# Patient Record
Sex: MALE | Race: WHITE | Employment: OTHER | URBAN - METROPOLITAN AREA
[De-identification: names, ages, dates, MRNs, and addresses within clinical notes are randomized per-mention and may not be internally consistent; named-entity substitution may affect disease eponyms.]

---

## 2017-01-24 ENCOUNTER — HOSPITAL ENCOUNTER (OUTPATIENT)
Age: 82
Setting detail: OBSERVATION
Discharge: HOME | End: 2017-01-26
Attending: STUDENT IN AN ORGANIZED HEALTH CARE EDUCATION/TRAINING PROGRAM | Admitting: INTERNAL MEDICINE
Payer: MEDICARE

## 2017-01-24 ENCOUNTER — APPOINTMENT (OUTPATIENT)
Dept: MRI IMAGING | Age: 82
End: 2017-01-24
Attending: STUDENT IN AN ORGANIZED HEALTH CARE EDUCATION/TRAINING PROGRAM
Payer: MEDICARE

## 2017-01-24 ENCOUNTER — APPOINTMENT (OUTPATIENT)
Dept: GENERAL RADIOLOGY | Age: 82
End: 2017-01-24
Attending: STUDENT IN AN ORGANIZED HEALTH CARE EDUCATION/TRAINING PROGRAM
Payer: MEDICARE

## 2017-01-24 ENCOUNTER — APPOINTMENT (OUTPATIENT)
Dept: CT IMAGING | Age: 82
End: 2017-01-24
Attending: STUDENT IN AN ORGANIZED HEALTH CARE EDUCATION/TRAINING PROGRAM
Payer: MEDICARE

## 2017-01-24 DIAGNOSIS — H53.8 BLURRED VISION, LEFT EYE: Primary | ICD-10-CM

## 2017-01-24 PROBLEM — Z98.49 HISTORY OF CATARACT EXTRACTION: Status: ACTIVE | Noted: 2017-01-24

## 2017-01-24 LAB
ALBUMIN SERPL BCP-MCNC: 3.4 G/DL (ref 3.2–4.6)
ALBUMIN/GLOB SERPL: 1 {RATIO} (ref 1.2–3.5)
ALP SERPL-CCNC: 98 U/L (ref 50–136)
ALT SERPL-CCNC: 15 U/L (ref 12–65)
ANION GAP BLD CALC-SCNC: 8 MMOL/L (ref 7–16)
AST SERPL W P-5'-P-CCNC: 22 U/L (ref 15–37)
ATRIAL RATE: 50 BPM
BACTERIA SPEC CULT: NORMAL
BASOPHILS # BLD AUTO: 0 K/UL (ref 0–0.2)
BASOPHILS # BLD: 1 % (ref 0–2)
BILIRUB SERPL-MCNC: 0.4 MG/DL (ref 0.2–1.1)
BUN SERPL-MCNC: 38 MG/DL (ref 8–23)
CALCIUM SERPL-MCNC: 8.4 MG/DL (ref 8.3–10.4)
CALCULATED P AXIS, ECG09: 116 DEGREES
CALCULATED R AXIS, ECG10: -79 DEGREES
CALCULATED T AXIS, ECG11: 165 DEGREES
CHLORIDE SERPL-SCNC: 109 MMOL/L (ref 98–107)
CO2 SERPL-SCNC: 28 MMOL/L (ref 21–32)
CREAT SERPL-MCNC: 2.09 MG/DL (ref 0.8–1.5)
CRP SERPL-MCNC: <0.2 MG/DL (ref 0–0.9)
DIAGNOSIS, 93000: NORMAL
DIASTOLIC BP, ECG02: NORMAL MMHG
DIFFERENTIAL METHOD BLD: ABNORMAL
EOSINOPHIL # BLD: 0.3 K/UL (ref 0–0.8)
EOSINOPHIL NFR BLD: 5 % (ref 0.5–7.8)
ERYTHROCYTE [DISTWIDTH] IN BLOOD BY AUTOMATED COUNT: 14 % (ref 11.9–14.6)
ERYTHROCYTE [SEDIMENTATION RATE] IN BLOOD: 25 MM/HR (ref 0–30)
GLOBULIN SER CALC-MCNC: 3.5 G/DL (ref 2.3–3.5)
GLUCOSE SERPL-MCNC: 122 MG/DL (ref 65–100)
HCT VFR BLD AUTO: 38.4 % (ref 41.1–50.3)
HGB BLD-MCNC: 12 G/DL (ref 13.6–17.2)
IMM GRANULOCYTES # BLD: 0 K/UL (ref 0–0.5)
IMM GRANULOCYTES NFR BLD AUTO: 0.3 % (ref 0–5)
LYMPHOCYTES # BLD AUTO: 25 % (ref 13–44)
LYMPHOCYTES # BLD: 1.6 K/UL (ref 0.5–4.6)
MCH RBC QN AUTO: 31.7 PG (ref 26.1–32.9)
MCHC RBC AUTO-ENTMCNC: 31.3 G/DL (ref 31.4–35)
MCV RBC AUTO: 101.6 FL (ref 79.6–97.8)
MONOCYTES # BLD: 0.5 K/UL (ref 0.1–1.3)
MONOCYTES NFR BLD AUTO: 8 % (ref 4–12)
NEUTS SEG # BLD: 3.8 K/UL (ref 1.7–8.2)
NEUTS SEG NFR BLD AUTO: 61 % (ref 43–78)
P-R INTERVAL, ECG05: NORMAL MS
PLATELET # BLD AUTO: 162 K/UL (ref 150–450)
PMV BLD AUTO: 11.1 FL (ref 10.8–14.1)
POTASSIUM SERPL-SCNC: 4.3 MMOL/L (ref 3.5–5.1)
PROT SERPL-MCNC: 6.9 G/DL (ref 6.3–8.2)
Q-T INTERVAL, ECG07: 484 MS
QRS DURATION, ECG06: 144 MS
QTC CALCULATION (BEZET), ECG08: 441 MS
RBC # BLD AUTO: 3.78 M/UL (ref 4.23–5.67)
SERVICE CMNT-IMP: NORMAL
SODIUM SERPL-SCNC: 145 MMOL/L (ref 136–145)
SYSTOLIC BP, ECG01: NORMAL MMHG
VENTRICULAR RATE, ECG03: 50 BPM
WBC # BLD AUTO: 6.3 K/UL (ref 4.3–11.1)

## 2017-01-24 PROCEDURE — 70450 CT HEAD/BRAIN W/O DYE: CPT

## 2017-01-24 PROCEDURE — 85025 COMPLETE CBC W/AUTO DIFF WBC: CPT | Performed by: STUDENT IN AN ORGANIZED HEALTH CARE EDUCATION/TRAINING PROGRAM

## 2017-01-24 PROCEDURE — 85652 RBC SED RATE AUTOMATED: CPT | Performed by: STUDENT IN AN ORGANIZED HEALTH CARE EDUCATION/TRAINING PROGRAM

## 2017-01-24 PROCEDURE — 87641 MR-STAPH DNA AMP PROBE: CPT | Performed by: INTERNAL MEDICINE

## 2017-01-24 PROCEDURE — 74011250636 HC RX REV CODE- 250/636: Performed by: INTERNAL MEDICINE

## 2017-01-24 PROCEDURE — 99285 EMERGENCY DEPT VISIT HI MDM: CPT | Performed by: STUDENT IN AN ORGANIZED HEALTH CARE EDUCATION/TRAINING PROGRAM

## 2017-01-24 PROCEDURE — 93005 ELECTROCARDIOGRAM TRACING: CPT | Performed by: STUDENT IN AN ORGANIZED HEALTH CARE EDUCATION/TRAINING PROGRAM

## 2017-01-24 PROCEDURE — 74011250637 HC RX REV CODE- 250/637: Performed by: INTERNAL MEDICINE

## 2017-01-24 PROCEDURE — 99218 HC RM OBSERVATION: CPT

## 2017-01-24 PROCEDURE — 80053 COMPREHEN METABOLIC PANEL: CPT | Performed by: STUDENT IN AN ORGANIZED HEALTH CARE EDUCATION/TRAINING PROGRAM

## 2017-01-24 PROCEDURE — 74011000302 HC RX REV CODE- 302: Performed by: INTERNAL MEDICINE

## 2017-01-24 PROCEDURE — 71020 XR CHEST PA LAT: CPT

## 2017-01-24 PROCEDURE — 86140 C-REACTIVE PROTEIN: CPT | Performed by: STUDENT IN AN ORGANIZED HEALTH CARE EDUCATION/TRAINING PROGRAM

## 2017-01-24 PROCEDURE — 86580 TB INTRADERMAL TEST: CPT | Performed by: INTERNAL MEDICINE

## 2017-01-24 PROCEDURE — 96374 THER/PROPH/DIAG INJ IV PUSH: CPT | Performed by: STUDENT IN AN ORGANIZED HEALTH CARE EDUCATION/TRAINING PROGRAM

## 2017-01-24 RX ORDER — HYDROCODONE BITARTRATE AND ACETAMINOPHEN 5; 325 MG/1; MG/1
1 TABLET ORAL
Status: DISCONTINUED | OUTPATIENT
Start: 2017-01-24 | End: 2017-01-26 | Stop reason: HOSPADM

## 2017-01-24 RX ORDER — NALOXONE HYDROCHLORIDE 0.4 MG/ML
0.4 INJECTION, SOLUTION INTRAMUSCULAR; INTRAVENOUS; SUBCUTANEOUS AS NEEDED
Status: DISCONTINUED | OUTPATIENT
Start: 2017-01-24 | End: 2017-01-26 | Stop reason: HOSPADM

## 2017-01-24 RX ORDER — ZOLPIDEM TARTRATE 5 MG/1
5 TABLET ORAL
Status: DISCONTINUED | OUTPATIENT
Start: 2017-01-24 | End: 2017-01-26 | Stop reason: HOSPADM

## 2017-01-24 RX ORDER — HEPARIN SODIUM 5000 [USP'U]/ML
5000 INJECTION, SOLUTION INTRAVENOUS; SUBCUTANEOUS EVERY 8 HOURS
Status: DISCONTINUED | OUTPATIENT
Start: 2017-01-24 | End: 2017-01-26 | Stop reason: HOSPADM

## 2017-01-24 RX ORDER — LORAZEPAM 2 MG/ML
1 INJECTION INTRAMUSCULAR
Status: DISCONTINUED | OUTPATIENT
Start: 2017-01-24 | End: 2017-01-26 | Stop reason: HOSPADM

## 2017-01-24 RX ORDER — FUROSEMIDE 20 MG/1
20 TABLET ORAL EVERY OTHER DAY
Status: DISCONTINUED | OUTPATIENT
Start: 2017-01-25 | End: 2017-01-26 | Stop reason: HOSPADM

## 2017-01-24 RX ORDER — SODIUM CHLORIDE 9 MG/ML
100 INJECTION, SOLUTION INTRAVENOUS CONTINUOUS
Status: DISCONTINUED | OUTPATIENT
Start: 2017-01-24 | End: 2017-01-24

## 2017-01-24 RX ORDER — LEVOTHYROXINE SODIUM 75 UG/1
75 TABLET ORAL
Status: DISCONTINUED | OUTPATIENT
Start: 2017-01-25 | End: 2017-01-26 | Stop reason: HOSPADM

## 2017-01-24 RX ORDER — SODIUM BICARBONATE 650 MG/1
650 TABLET ORAL 3 TIMES DAILY
Status: DISCONTINUED | OUTPATIENT
Start: 2017-01-24 | End: 2017-01-26 | Stop reason: HOSPADM

## 2017-01-24 RX ORDER — CLOPIDOGREL BISULFATE 75 MG/1
75 TABLET ORAL DAILY
Status: DISCONTINUED | OUTPATIENT
Start: 2017-01-25 | End: 2017-01-26 | Stop reason: HOSPADM

## 2017-01-24 RX ORDER — SODIUM CHLORIDE 0.9 % (FLUSH) 0.9 %
5-10 SYRINGE (ML) INJECTION EVERY 8 HOURS
Status: DISCONTINUED | OUTPATIENT
Start: 2017-01-24 | End: 2017-01-26 | Stop reason: HOSPADM

## 2017-01-24 RX ORDER — ACETAMINOPHEN 325 MG/1
650 TABLET ORAL
Status: DISCONTINUED | OUTPATIENT
Start: 2017-01-24 | End: 2017-01-26 | Stop reason: HOSPADM

## 2017-01-24 RX ORDER — ONDANSETRON 2 MG/ML
4 INJECTION INTRAMUSCULAR; INTRAVENOUS
Status: DISCONTINUED | OUTPATIENT
Start: 2017-01-24 | End: 2017-01-26 | Stop reason: HOSPADM

## 2017-01-24 RX ORDER — CARVEDILOL 6.25 MG/1
6.25 TABLET ORAL 2 TIMES DAILY WITH MEALS
Status: DISCONTINUED | OUTPATIENT
Start: 2017-01-24 | End: 2017-01-26 | Stop reason: HOSPADM

## 2017-01-24 RX ORDER — SIMVASTATIN 10 MG/1
20 TABLET, FILM COATED ORAL
Status: DISCONTINUED | OUTPATIENT
Start: 2017-01-24 | End: 2017-01-26 | Stop reason: HOSPADM

## 2017-01-24 RX ORDER — SODIUM CHLORIDE 0.9 % (FLUSH) 0.9 %
5-10 SYRINGE (ML) INJECTION AS NEEDED
Status: DISCONTINUED | OUTPATIENT
Start: 2017-01-24 | End: 2017-01-26 | Stop reason: HOSPADM

## 2017-01-24 RX ORDER — HYDRALAZINE HYDROCHLORIDE 20 MG/ML
20 INJECTION INTRAMUSCULAR; INTRAVENOUS
Status: DISCONTINUED | OUTPATIENT
Start: 2017-01-24 | End: 2017-01-26 | Stop reason: HOSPADM

## 2017-01-24 RX ORDER — DIPHENHYDRAMINE HYDROCHLORIDE 50 MG/ML
12.5 INJECTION, SOLUTION INTRAMUSCULAR; INTRAVENOUS
Status: DISCONTINUED | OUTPATIENT
Start: 2017-01-24 | End: 2017-01-26 | Stop reason: HOSPADM

## 2017-01-24 RX ORDER — FACIAL-BODY WIPES
10 EACH TOPICAL DAILY PRN
Status: DISCONTINUED | OUTPATIENT
Start: 2017-01-24 | End: 2017-01-26 | Stop reason: HOSPADM

## 2017-01-24 RX ADMIN — LORAZEPAM 1 MG: 2 INJECTION INTRAMUSCULAR; INTRAVENOUS at 16:44

## 2017-01-24 RX ADMIN — TUBERCULIN PURIFIED PROTEIN DERIVATIVE 5 UNITS: 5 INJECTION, SOLUTION INTRADERMAL at 19:43

## 2017-01-24 RX ADMIN — Medication 10 ML: at 21:22

## 2017-01-24 RX ADMIN — SIMVASTATIN 20 MG: 10 TABLET, FILM COATED ORAL at 21:22

## 2017-01-24 RX ADMIN — SODIUM BICARBONATE TAB 650 MG 650 MG: 650 TAB at 21:21

## 2017-01-24 NOTE — PROGRESS NOTES
Attempted MRI - patient disoriented and moving despite straps, sand bags and many reminders. Patient came up off the table and it was unsafe to continue. Staff response called for assistance. Notified ER who said the patient was now an ICU patient. Called ICU and they said the patient was not there patient yet. Sent for transport - close guard assist at all times.

## 2017-01-24 NOTE — ED PROVIDER NOTES
HPI Comments: 26-year-old male patient presents to the emergency department with reports of left-sided visual blurriness. Patient states he woke with the symptoms this morning. He denies any pain to the eye, redness or drainage. He denies any dizzy or lightheaded feeling, facial droop or trouble with his speech. He denies any focal weakness numbness or tingling elsewhere in his body. Patient denies previously similar symptoms in the past.  Of note he has undergone cataract removal in both his left and right eye. He reports constant symptoms since waking this morning. He states his vision was normal prior to going to bed last night. He reports a history of coronary artery disease, open heart surgery with bypass and stent placement. Patient is currently taking Plavix secondary to stents. He denies any other symptoms at this time. Patient is a 80 y.o. male presenting with blurred vision. The history is provided by the patient and a relative. No  was used. Blurred Vision    This is a new problem. The current episode started 3 to 5 hours ago. The problem occurs constantly. The problem has not changed since onset. The left eye is affected. The injury mechanism was none. The pain is at a severity of 0/10. The patient is experiencing no pain. There is no history of trauma to the eye. There is no known exposure to pink eye. He does not wear contacts. Associated symptoms include blurred vision and decreased vision. Pertinent negatives include no numbness, no discharge, no double vision, no foreign body sensation, no photophobia, no eye redness, no nausea, no vomiting, no tingling, no weakness, no itching, no fever, no pain, no blindness, no head injury and no dizziness. He has tried nothing for the symptoms. The treatment provided no relief.         Past Medical History:   Diagnosis Date    Angina  6/10/2016    Arthritis     CAD (coronary artery disease)      CABG    Cancer ([facility] [address]      skin cancer removed 5 years ago    Chronic kidney disease     GERD (gastroesophageal reflux disease)     Heart failure (HCC)     Hypertension     Orthopnea     Other ill-defined conditions(799.89)      HYPERLIPIDEMIA/ GOUT/ HYPOTHYROID    Syncope, near     Thyroid disease        Past Surgical History:   Procedure Laterality Date    Hx gi      Hx appendectomy      Hx other surgical       Left inguinal hernia surgery    Pr cardiac surg procedure unlist       cabg 10 years ago    Hx heent       cataract         History reviewed. No pertinent family history. Social History     Social History    Marital status:      Spouse name: N/A    Number of children: N/A    Years of education: N/A     Occupational History    Not on file. Social History Main Topics    Smoking status: Former Smoker    Smokeless tobacco: Not on file    Alcohol use No    Drug use: No    Sexual activity: Not on file     Other Topics Concern    Not on file     Social History Narrative         ALLERGIES: Review of patient's allergies indicates no known allergies. Review of Systems   Constitutional: Negative for chills, diaphoresis and fever. HENT: Negative for congestion, sneezing and sore throat. Eyes: Positive for blurred vision and visual disturbance. Negative for blindness, double vision, photophobia, pain, discharge and redness. Respiratory: Negative for cough, chest tightness, shortness of breath and wheezing. Cardiovascular: Negative for chest pain and leg swelling. Gastrointestinal: Negative for abdominal pain, blood in stool, diarrhea, nausea and vomiting. Endocrine: Negative for polyuria. Genitourinary: Negative for difficulty urinating, dysuria, flank pain, hematuria and urgency. Musculoskeletal: Negative for back pain, myalgias, neck pain and neck stiffness. Skin: Negative for color change, itching and rash.    Neurological: Negative for dizziness, tingling, syncope, speech difficulty, weakness, light-headedness, numbness and headaches. Psychiatric/Behavioral: Negative for behavioral problems. All other systems reviewed and are negative. Vitals:    01/24/17 1016   BP: 128/84   Pulse: (!) 56   Resp: 18   Temp: 97.5 °F (36.4 °C)   SpO2: 93%   Weight: 59 kg (130 lb)   Height: 5' 10\" (1.778 m)            Physical Exam   Constitutional: He is oriented to person, place, and time. He appears well-developed and well-nourished. No distress. HENT:   Head: Normocephalic and atraumatic. Eyes: Conjunctivae and EOM are normal. Right eye exhibits no chemosis, no discharge, no exudate and no hordeolum. No foreign body present in the right eye. Left eye exhibits no chemosis, no discharge, no exudate and no hordeolum. No foreign body present in the left eye. Right conjunctiva is not injected. Right conjunctiva has no hemorrhage. Left conjunctiva is not injected. Left conjunctiva has no hemorrhage. No scleral icterus. Right eye exhibits normal extraocular motion and no nystagmus. Left eye exhibits normal extraocular motion and no nystagmus. Right pupil is round and reactive. Left pupil is not round and not reactive. Left pupil is oblong in shape secondary to surgical changes from previous cataract surgery. Minimally reactive. Well-appearing right eyes/pupil   Neck: Normal range of motion and full passive range of motion without pain. Neck supple. Normal carotid pulses, no hepatojugular reflux and no JVD present. No spinous process tenderness and no muscular tenderness present. Carotid bruit is not present. No rigidity. No tracheal deviation, no edema, no erythema and normal range of motion present. Cardiovascular: Normal rate, regular rhythm, S1 normal, S2 normal, normal heart sounds and intact distal pulses. Exam reveals no gallop, no distant heart sounds and no friction rub. No murmur heard. Pulmonary/Chest: Effort normal and breath sounds normal. No accessory muscle usage or stridor.  No tachypnea and no bradypnea. No respiratory distress. He has no decreased breath sounds. He has no wheezes. He has no rhonchi. He has no rales. He exhibits no tenderness. Abdominal: Soft. Normal appearance. He exhibits no distension and no mass. There is no hepatosplenomegaly, splenomegaly or hepatomegaly. There is no tenderness. There is no rebound, no guarding and no CVA tenderness. Musculoskeletal: Normal range of motion. He exhibits no edema, tenderness or deformity. Neurological: He is alert and oriented to person, place, and time. He has normal reflexes. He displays no atrophy and no tremor. No cranial nerve deficit. He exhibits normal muscle tone. He displays a negative Romberg sign. He displays no seizure activity. Coordination and gait normal. GCS eye subscore is 4. GCS verbal subscore is 5. GCS motor subscore is 6. No focal nerve deficits. Subjective visual field blurring in the left eye. Skin: Skin is warm and dry. No rash noted. He is not diaphoretic. Psychiatric: He has a normal mood and affect. His behavior is normal.   Nursing note and vitals reviewed. MDM  Number of Diagnoses or Management Options  Blurred vision, left eye: new and requires workup  Diagnosis management comments: CT scan shows no acute abnormality. Chronic small vessel changes noted by radiologist.    Patient evaluated by specialist on call who recommends admission for MRI and carotid ultrasound imaging. Recommends CRP and sedimentation rate evaluation as well.   In addition patient should be referred to ophthalmology for a dilated retinal exam.       Amount and/or Complexity of Data Reviewed  Clinical lab tests: ordered and reviewed  Tests in the radiology section of CPT®: ordered and reviewed  Tests in the medicine section of CPT®: ordered and reviewed  Independent visualization of images, tracings, or specimens: yes    Risk of Complications, Morbidity, and/or Mortality  Presenting problems: moderate  Diagnostic procedures: low  Management options: moderate    Patient Progress  Patient progress: stable    ED Course       Procedures

## 2017-01-24 NOTE — PROGRESS NOTES
TRANSFER - IN REPORT:    Verbal report received from [employer] on Zaheer Monaco  being received from ED for routine progression of care      Report consisted of patients Situation, Background, Assessment and   Recommendations(SBAR). Information from the following report(s) ED Summary was reviewed with the receiving nurse. Opportunity for questions and clarification was provided. Assessment completed upon patients arrival to unit and care assumed.

## 2017-01-24 NOTE — H&P
Hospitalist H&P/Consult Note     Admit Date:  2017 10:25 AM   Name:  Antoinette Villa   Age:  80 y.o.  :  5/15/1922   MRN:  [id number]   PCP:  Hyacinth Andres MD  Treatment Team: Attending Provider: Celena Dao DO; Primary Nurse: Soo Kaufman RN    HPI:   Patient is a 81 y/o M who p/w complaint of severe L eye blurred vision that he noticed when he woke up this morning. Denies any other symptoms. Denies eye pain, eye trauma, HA, n/v, focal weakness/numbness, trouble with speech, confusion. Denies similar previous episodes. Denies trouble walking. Some issues with coordination due to vision. Denies hx of CVA/TIA. In on plavix for stenting in past.  On statin also. ER called teleneuro who felt pt needed MRI and carotid US to eval for possible CVA. 10 systems reviewed and negative except as noted in HPI. Past Medical History   Diagnosis Date    Angina  6/10/2016    Arthritis     CAD (coronary artery disease)      CABG    Cancer (HCC)      skin cancer removed 5 years ago    Chronic kidney disease     GERD (gastroesophageal reflux disease)     Heart failure ([facility] [address]     Hypertension     Orthopnea     Other ill-defined conditions(799.89)      HYPERLIPIDEMIA/ GOUT/ HYPOTHYROID    Syncope, near     Thyroid disease       Past Surgical History   Procedure Laterality Date    Hx gi      Hx appendectomy      Hx other surgical       Left inguinal hernia surgery    Pr cardiac surg procedure unlist       cabg 10 years ago    Hx heent       cataract      Prior to Admission Medications   Prescriptions Last Dose Informant Patient Reported? Taking? CYANOCOBALAMIN/FA/PYRIDOXINE (HOMOCYSTEINE FORMULA PO)   Yes No   Sig: Take 2 Tabs by mouth daily. LEVOCARNITINE (CARNITINE PO)   Yes No   Sig: Take 50 mg by mouth. amLODIPine (NORVASC) 10 mg tablet   Yes No   Sig: Take  by mouth daily.      carvedilol (COREG) 6.25 mg tablet   Yes No   Sig: Take  by mouth two (2) times daily (with meals). cholecalciferol (VITAMIN D3) 400 unit Tab tablet   Yes No   Sig: Take  by mouth daily. clopidogrel (PLAVIX) 75 mg tablet   No No   Sig: Take 1 Tab by mouth daily. coQ10, ubiquinol, 100 mg cap   Yes No   Sig: Take  by mouth.     colchicine 0.6 mg tablet   Yes No   Sig: Take 0.6 mg by mouth daily. furosemide (LASIX) 40 mg tablet   Yes No   Sig: Take 20 mg by mouth every other day. isosorbide mononitrate ER (IMDUR) 60 mg CR tablet   Yes No   Sig: Take  by mouth every morning.     krill oil 500 mg cap   Yes No   Sig: Take 1,000 mg by mouth two (2) times a day. lecithin 1,200 mg cap   Yes No   Sig: Take  by mouth three (3) times daily. levothyroxine (SYNTHROID) 75 mcg tablet   Yes No   Sig: Take  by mouth Daily (before breakfast). magnesium 500 mg Tab   Yes No   Sig: Take  by mouth daily. nitroglycerin (NITROSTAT) 0.4 mg SL tablet   Yes No   Sig: by SubLINGual route every five (5) minutes as needed. nitroglycerin (NITROSTAT) 0.4 mg SL tablet   No No   Si Tab by SubLINGual route every five (5) minutes as needed for Chest Pain.   simvastatin (ZOCOR) 40 mg tablet   Yes No   Sig: Take 20 mg by mouth nightly.     sodium bicarbonate 650 mg tablet   Yes No   Sig: Take  by mouth three (3) times daily. valsartan (DIOVAN) 160 mg tablet   Yes No   Sig: Take 80 mg by mouth two (2) times a day. zinc 50 mg capsule   Yes No   Sig: Take  by mouth daily. Facility-Administered Medications: None     No Known Allergies   Social History   Substance Use Topics    Smoking status: Former Smoker    Smokeless tobacco: Not on file    Alcohol use No      History reviewed. No pertinent family history. There is no immunization history for the selected administration types on file for this patient.     Objective:   Patient Vitals for the past 24 hrs:   Temp Pulse Resp BP SpO2   17 1211 - (!) 53 20 179/81 96 %   17 1016 97.5 °F (36.4 °C) (!) 56 18 128/84 93 %     Oxygen Therapy  O2 Sat (%): 96 % (01/24/17 1211)  O2 Device: Room air (01/24/17 1211)  No intake or output data in the 24 hours ending 01/24/17 1429    Physical Exam:  General:    Well nourished. Alert. Eyes:   Severe blurred vision L eye; can see my shape but cannot see my face or count fingers on my hand with good eye covered. Normal sclera. Extraocular movements intact. PERRL  ENT:  Normocephalic, atraumatic. Moist mucous membranes  CV:   RRR. No murmur, rub, or gallop. Lungs:  CTAB. No wheezing, rhonchi, or rales. Abdomen: Soft, nontender, nondistended. Bowel sounds normal.   Extremities: Warm and dry. No cyanosis or edema. Neurologic: CN II-XII grossly intact. Sensation intact. Skin:     No rashes or jaundice. No wounds. Psych:  Normal mood and affect. I reviewed the labs, imaging, EKGs, telemetry, and other studies done this admission. Data Review:   Recent Results (from the past 24 hour(s))   CBC WITH AUTOMATED DIFF    Collection Time: 01/24/17 11:20 AM   Result Value Ref Range    WBC 6.3 4.3 - 11.1 K/uL    RBC 3.78 (L) 4.23 - 5.67 M/uL    HGB 12.0 (L) 13.6 - 17.2 g/dL    HCT 38.4 (L) 41.1 - 50.3 %    .6 (H) 79.6 - 97.8 FL    MCH 31.7 26.1 - 32.9 PG    MCHC 31.3 (L) 31.4 - 35.0 g/dL    RDW 14.0 11.9 - 14.6 %    PLATELET 724 383 - 867 K/uL    MPV 11.1 10.8 - 14.1 FL    DF AUTOMATED      NEUTROPHILS 61 43 - 78 %    LYMPHOCYTES 25 13 - 44 %    MONOCYTES 8 4.0 - 12.0 %    EOSINOPHILS 5 0.5 - 7.8 %    BASOPHILS 1 0.0 - 2.0 %    IMMATURE GRANULOCYTES 0.3 0.0 - 5.0 %    ABS. NEUTROPHILS 3.8 1.7 - 8.2 K/UL    ABS. LYMPHOCYTES 1.6 0.5 - 4.6 K/UL    ABS. MONOCYTES 0.5 0.1 - 1.3 K/UL    ABS. EOSINOPHILS 0.3 0.0 - 0.8 K/UL    ABS. BASOPHILS 0.0 0.0 - 0.2 K/UL    ABS. IMM.  GRANS. 0.0 0.0 - 0.5 K/UL   METABOLIC PANEL, COMPREHENSIVE    Collection Time: 01/24/17 11:20 AM   Result Value Ref Range    Sodium 145 136 - 145 mmol/L    Potassium 4.3 3.5 - 5.1 mmol/L    Chloride 109 (H) 98 - 107 mmol/L CO2 28 21 - 32 mmol/L    Anion gap 8 7 - 16 mmol/L    Glucose 122 (H) 65 - 100 mg/dL    BUN 38 (H) 8 - 23 MG/DL    Creatinine 2.09 (H) 0.8 - 1.5 MG/DL    GFR est AA 38 (L) >60 ml/min/1.73m2    GFR est non-AA 32 (L) >60 ml/min/1.73m2    Calcium 8.4 8.3 - 10.4 MG/DL    Bilirubin, total 0.4 0.2 - 1.1 MG/DL    ALT 15 12 - 65 U/L    AST 22 15 - 37 U/L    Alk. phosphatase 98 50 - 136 U/L    Protein, total 6.9 6.3 - 8.2 g/dL    Albumin 3.4 3.2 - 4.6 g/dL    Globulin 3.5 2.3 - 3.5 g/dL    A-G Ratio 1.0 (L) 1.2 - 3.5     SED RATE, AUTOMATED    Collection Time: 01/24/17 11:20 AM   Result Value Ref Range    Sed rate, automated 25 0 - 30 mm/hr   C REACTIVE PROTEIN, QT    Collection Time: 01/24/17 11:20 AM   Result Value Ref Range    C-Reactive protein <0.2 0.0 - 0.9 mg/dL       Imaging Studies:  CXR Results  (Last 48 hours)               01/24/17 1042  XR CHEST PA LAT Final result    Impression:  Impression: Stable two-view chest. No acute abnormality. Narrative:  History: chest pain       Two views chest       COMPARISON: 11/12/2012       Findings: Stable postoperative changes noted. The lungs are well expanded and   clear. The cardiac silhouette, and mediastinal contour, and osseous structures   are stable. CT Results  (Last 48 hours)               01/24/17 1049  CT HEAD WO CONT Final result    Impression:  Impression: Chronic appearing white matter change without acute intracranial   abnormality. There is generalized cerebral atrophy as well. Narrative:  History: Vision changes, blurriness in the left eye. Exam: CT head without contrast       Technique: Thin section axial CT images were obtained from the skullbase through   the vertex. Radiation dose reduction techniques were used for this study. Our   CT scanners use one or all of the following: Automated exposure control,   adjustment of the mA and/or kV according to patient size, use of iterative   reconstruction. Findings: The ventricles are normal in size, shape, and position. There is   generalized cerebral atrophy with chronic appearing white matter change in the   corona radiata and centrum semiovale. No extra-axial fluid collection is   present. There is no mass or mass-effect. The basilar cisterns are patent. The   paranasal sinuses and mastoid air cells are clear. Assessment and Plan:     Hospital Problems as of 1/24/2017  Date Reviewed: 1/24/2017          Codes Class Noted - Resolved POA    * (Principal)Blurred vision, left eye ICD-10-CM: H53.8  ICD-9-CM: 368.8  1/24/2017 - Present Yes        History of cataract extraction ICD-10-CM: Z98.49  ICD-9-CM: V45.61  1/24/2017 - Present         Coronary artery disease involving coronary bypass graft of native heart with angina pectoris with documented spasm (HCC) (Chronic) ICD-10-CM: I25.701  ICD-9-CM: 414.05, 413.9  6/15/2016 - Present Yes        Chronic systolic heart failure (HCC) (Chronic) ICD-10-CM: I50.22  ICD-9-CM: 428.22  11/12/2012 - Present Yes        Hypothyroidism (Chronic) ICD-10-CM: E03.9  ICD-9-CM: 244.9  11/12/2012 - Present Yes        Essential hypertension, benign (Chronic) ICD-10-CM: I10  ICD-9-CM: 401.1  11/12/2012 - Present Yes        CKD (chronic kidney disease) stage 3, GFR 30-59 ml/min (Chronic) ICD-10-CM: N18.3  ICD-9-CM: 585.3  11/12/2012 - Present Yes              PLAN:  · Observation on telemetry. Can convert to inpt if CVA seen on MRI  · per teleneuro recs: check MRI and carotid US. Referral to optho if negative for dilated exam; have been told they don't do this as inpatient. ESR CRP neg. · CHF stable, chronic. Cont home meds. · CKD stable, monitor.     FEN:  Cardiac diet  DVT ppx:  heparin  Code status:  Full per pt  Estimated LOS:  24 hours  Anticipated discharge needs:  none  Plan of care discussed with:  pt  Risk:  high    Signed:  Sarahy Canales MD

## 2017-01-24 NOTE — ED NOTES
TRANSFER - OUT REPORT:    Verbal report given to adele kolb on [employer]  being transferred to [phone]) for routine progression of care       Report consisted of patients Situation, Background, Assessment and   Recommendations(SBAR). Information from the following report(s) ED Summary was reviewed with the receiving nurse. Lines:       Opportunity for questions and clarification was provided.       Patient transported with:   Registered Nurse

## 2017-01-24 NOTE — IP AVS SNAPSHOT
[address] [place] [address] 
[phone] Patient: Aden Johnson MRN: [id number] :5/15/1922 You are allergic to the following No active allergies Immunizations Administered for This Admission Name Date  
 TB Skin Test (PPD) Intradermal 2017 Recent Documentation Height Weight BMI Smoking Status 1.778 m 62.8 kg 19.86 kg/m2 Former Smoker Emergency Contacts Name Discharge Info Relation Home Work Mobile Jack Lorenzo  Son [22] [phone] About your hospitalization You were admitted on:  2017 You last received care in the:  [address] You were discharged on:  2017 Unit phone number:  [phone] Why you were hospitalized Your primary diagnosis was:  Blurred Vision, Left Eye Your diagnoses also included:  Ckd (Chronic Kidney Disease) Stage 3, Gfr 30-59 Ml/Min, Chronic Systolic Heart Failure (Hcc), 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 Isaias Bri.  @ 1:45 @ [facility] [place] [address] [place] [address] 
[phone] MD Alfonso Junior Chayo [address] [phone] Current Discharge Medication List  
  
 CONTINUE these medications which have CHANGED Dose & Instructions Dispensing Information Comments Morning Noon Evening Bedtime  
 nitroglycerin 0.4 mg SL tablet Commonly known as:  NITROSTAT What changed:  Another medication with the same name was removed. Continue taking this medication, and follow the directions you see here. Your next dose is: Today, Tomorrow Other:  _________ Dose:  0.4 mg  
1 Tab by SubLINGual route every five (5) minutes as needed for Chest Pain. Quantity:  1 Bottle Refills:  3 CONTINUE these medications which have NOT CHANGED Dose & Instructions Dispensing Information Comments Morning Noon Evening Bedtime  
 amLODIPine 10 mg tablet Commonly known as:  Khadijah Gables Your next dose is: Today, Tomorrow Other:  _________ Take  by mouth daily. Refills:  0 CARNITINE PO Your next dose is: Today, Tomorrow Other:  _________ Dose:  50 mg Take 50 mg by mouth. Refills:  0  
     
   
   
   
  
 carvedilol 6.25 mg tablet Commonly known as:  Gaylin Windber Your next dose is: Today, Tomorrow Other:  _________ Take  by mouth two (2) times daily (with meals). Refills:  0  
     
   
   
   
  
 cholecalciferol 400 unit Tab tablet Commonly known as:  VITAMIN D3 Your next dose is: Today, Tomorrow Other:  _________ Take  by mouth daily. Refills:  0  
     
   
   
   
  
 clopidogrel 75 mg Tab Commonly known as:  PLAVIX Your next dose is: Today, Tomorrow Other:  _________ Dose:  75 mg Take 1 Tab by mouth daily. Quantity:  90 Tab Refills:  3  
     
   
   
   
  
 colchicine 0.6 mg tablet Your next dose is: Today, Tomorrow Other:  _________ Dose:  0.6 mg Take 0.6 mg by mouth daily. Refills:  0  
     
   
   
   
  
 coQ10 (ubiquinol) 100 mg Cap Your next dose is: Today, Tomorrow Other:  _________ Take  by mouth. Refills:  0  
     
   
   
   
  
 HOMOCYSTEINE FORMULA PO Your next dose is: Today, Tomorrow Other:  _________ Dose:  2 Tab Take 2 Tabs by mouth daily. Refills:  0  
     
   
   
   
  
 isosorbide mononitrate ER 60 mg CR tablet Commonly known as:  IMDUR Your next dose is: Today, Tomorrow Other:  _________ Take  by mouth every morning. Refills:  0  
     
   
   
   
  
 krill oil 500 mg Cap Your next dose is: Today, Tomorrow Other:  _________ Dose:  1000 mg Take 1,000 mg by mouth two (2) times a day. Refills:  0  
     
   
   
   
  
 LASIX 40 mg tablet Generic drug:  furosemide Your next dose is: Today, Tomorrow Other:  _________ Dose:  20 mg Take 20 mg by mouth every other day. Refills:  0  
     
   
   
   
  
 lecithin 1,200 mg Cap Your next dose is: Today, Tomorrow Other:  _________ Take  by mouth three (3) times daily. Refills:  0  
     
   
   
   
  
 levothyroxine 75 mcg tablet Commonly known as:  SYNTHROID Your next dose is: Today, Tomorrow Other:  _________ Take  by mouth Daily (before breakfast). Refills:  0  
     
   
   
   
  
 magnesium 500 mg Tab Your next dose is: Today, Tomorrow Other:  _________ Take  by mouth daily. Refills:  0  
     
   
   
   
  
 simvastatin 40 mg tablet Commonly known as:  ZOCOR Your next dose is: Today, Tomorrow Other:  _________ Dose:  20 mg Take 20 mg by mouth nightly. Refills:  0  
     
   
   
   
  
 sodium bicarbonate 650 mg tablet Your next dose is: Today, Tomorrow Other:  _________ Take  by mouth three (3) times daily. Refills:  0  
     
   
   
   
  
 valsartan 160 mg tablet Commonly known as:  DIOVAN  
 Your next dose is: Today, Tomorrow Other:  _________ Dose:  80 mg Take 80 mg by mouth two (2) times a day. Refills:  0  
     
   
   
   
  
 zinc 50 mg capsule Your next dose is: Today, Tomorrow Other:  _________ Take  by mouth daily. Refills:  0 Discharge Instructions DISCHARGE SUMMARY from Nurse The following personal items are in your possession at time of discharge: 
 
Dental Appliances: At bedside Visual Aid: Glasses, With patient Home Medications: None Jewelry: None Clothing: Pants, Shirt, Socks, Undergarments, With patient Other Valuables: None PATIENT INSTRUCTIONS: 
 
After general anesthesia or intravenous sedation, for 24 hours or while taking prescription Narcotics: · Limit your activities · Do not drive and operate hazardous machinery · Do not make important personal or business decisions · Do  not drink alcoholic beverages · If you have not urinated within 8 hours after discharge, please contact your surgeon on call. Report the following to your surgeon: 
· Excessive pain, swelling, redness or odor of or around the surgical area · Temperature over 100.5 · Nausea and vomiting lasting longer than 4 hours or if unable to take medications · Any signs of decreased circulation or nerve impairment to extremity: change in color, persistent  numbness, tingling, coldness or increase pain · Any questions What to do at Home: 
Recommended activity: Activity as tolerated and No driving while on analgesics, If you experience any of the above symptoms, please follow up with MD. 
 
 
*  Please give a list of your current medications to your Primary Care Provider. *  Please update this list whenever your medications are discontinued, doses are 
    changed, or new medications (including over-the-counter products) are added. *  Please carry medication information at all times in case of emergency situations. These are general instructions for a healthy lifestyle: No smoking/ No tobacco products/ Avoid exposure to second hand smoke Surgeon General's Warning:  Quitting smoking now greatly reduces serious risk to your health. Obesity, smoking, and sedentary lifestyle greatly increases your risk for illness A healthy diet, regular physical exercise & weight monitoring are important for maintaining a healthy lifestyle You may be retaining fluid if you have a history of heart failure or if you experience any of the following symptoms:  Weight gain of 3 pounds or more overnight or 5 pounds in a week, increased swelling in our hands or feet or shortness of breath while lying flat in bed. Please call your doctor as soon as you notice any of these symptoms; do not wait until your next office visit. Recognize signs and symptoms of STROKE: 
 
F-face looks uneven A-arms unable to move or move unevenly S-speech slurred or non-existent T-time-call 911 as soon as signs and symptoms begin-DO NOT go Back to bed or wait to see if you get better-TIME IS BRAIN. Warning Signs of HEART ATTACK Call 911 if you have these symptoms: 
? Chest discomfort. Most heart attacks involve discomfort in the center of the chest that lasts more than a few minutes, or that goes away and comes back. It can feel like uncomfortable pressure, squeezing, fullness, or pain. ? Discomfort in other areas of the upper body. Symptoms can include pain or discomfort in one or both arms, the back, neck, jaw, or stomach. ? Shortness of breath with or without chest discomfort. ? Other signs may include breaking out in a cold sweat, nausea, or lightheadedness. Don't wait more than five minutes to call [address]! Fast action can save your life.  Calling 911 is almost always the fastest way to get lifesaving treatment. Emergency Medical Services staff can begin treatment when they arrive  up to an hour sooner than if someone gets to the hospital by car. The discharge information has been reviewed with the patient and caregiver. The patient and caregiver verbalized understanding. Discharge medications reviewed with the patient and caregiver and appropriate educational materials and side effects teaching were provided. Discharge Instructions Attachments/References WEAKNESS: GENERALIZED (ENGLISH) HAND-WASHING (ENGLISH) SECONDHAND SMOKE (ENGLISH) Discharge Orders None [facility] Announcement We are excited to announce that we are making your provider's discharge notes available to you in [employer]. You will see these notes when they are completed and signed by the physician that discharged you from your recent hospital stay. If you have any questions or concerns about any information you see in [employer], please call the Health Information Department where you were seen or reach out to your Primary Care Provider for more information about your plan of care. Introducing [facility]! Neal Dorman introduces [employer] patient portal. Now you can access parts of your medical record, email your doctor's office, and request medication refills online. 1. In your internet browser, go to [email or web address] 2. Click on the First Time User? Click Here link in the Sign In box. You will see the New Member Sign Up page. 3. Enter your [employer] Access Code exactly as it appears below. You will not need to use this code after youve completed the sign-up process. If you do not sign up before the expiration date, you must request a new code. · [employer] Access Code: [email or web address] Expires: 4/24/2017  9:59 AM 
 
4.  Enter the last four digits of your Social Security Number (xxxx) and Date of Birth (mm/dd/yyyy) as indicated and click Submit. You will be taken to the next sign-up page. 5. Create a VoterTide ID. This will be your VoterTide login ID and cannot be changed, so think of one that is secure and easy to remember. 6. Create a VoterTide password. You can change your password at any time. 7. Enter your Password Reset Question and Answer. This can be used at a later time if you forget your password. 8. Enter your e-mail address. You will receive e-mail notification when new information is available in [address] 9. Click Sign Up. You can now view and download portions of your medical record. 10. Click the Download Summary menu link to download a portable copy of your medical information. If you have questions, please visit the Frequently Asked Questions section of the [employer]oter[employer] website. Remember, [employer]oter[employer] is NOT to be used for urgent needs. For medical emergencies, dial 911. Now available from your iPhone and Android! General Information Please provide this summary of care documentation to your next provider. Patient Signature:  ____________________________________________________________ Date:  ____________________________________________________________  
  
Ja Walker Provider Signature:  ____________________________________________________________ Date:  ____________________________________________________________ More Information Weakness: Care Instructions Your Care Instructions Weakness is a lack of physical or muscle strength. You may feel that you need to make extra effort to move your arms, legs, or other muscles. Generalized weakness means that you feel weak in most areas of your body. Another type of weakness may affect just one muscle or group of muscles. You may feel weak and tired after you have done too much activity, such as taking an extra-long hike. This is not a serious problem.  It often goes away on its own. Feeling weak can also be caused by medical conditions like thyroid problems, depression, or a virus. Sometimes the cause can be serious. Your doctor may want to do more tests to try to find the cause of the weakness. The doctor has checked you carefully, but problems can develop later. If you notice any problems or new symptoms, get medical treatment right away. Follow-up care is a key part of your treatment and safety. Be sure to make and go to all appointments, and call your doctor if you are having problems. It's also a good idea to know your test results and keep a list of the medicines you take. How can you care for yourself at home? · Rest when you feel tired. · Be safe with medicines. If your doctor prescribed medicine, take it exactly as prescribed. Call your doctor if you think you are having a problem with your medicine. You will get more details on the specific medicines your doctor prescribes. · Do not skip meals. Eating a balanced diet may increase your energy level. · Get some physical activity every day, but do not get too tired. When should you call for help? Call your doctor now or seek immediate medical care if: 
· You have new or worse weakness. · You are dizzy or lightheaded, or you feel like you may faint. Watch closely for changes in your health, and be sure to contact your doctor if: 
· You do not get better as expected. Where can you learn more? Go to http://scooby-morris.info/. Enter [id number] [id number] 0607 in the search box to learn more about \"Weakness: Care Instructions. \" Current as of: May 27, 2016 Content Version: 11.1 © 8413-2309 [employer]. Care instructions adapted under license by [employer] (which disclaims liability or warranty for this information).  If you have questions about a medical condition or this instruction, always ask your healthcare professional. [employer] disclaims any warranty or liability for your use of this information. Hand-Washing: Care Instructions Your Care Instructions It is important for caregivers to wash their hands properly. This is the single best way to prevent the spread of infections. Hand-washing can help keep you from getting sick. It is easy, doesn't cost much, and it works. Make sure that you and your caregivers follow safe hand-washing routines. Caregivers may include health care workers or family members at home or in a care facility. You can talk to them about this information on hand-washing. Follow-up care is a key part of your treatment and safety. Be sure to make and go to all appointments, and call your doctor if you are having problems. It's also a good idea to know your test results and keep a list of the medicines you take. How can you care for yourself at home? · Caregivers should wash their hands with soap and water: ¨ When their hands are dirty, especially after being exposed to body fluids. This includes blood. ¨ When their hands may have been exposed to germs that could spread infection. ¨ After they touch broken skin, sores, or wound bandages. ¨ After they use the bathroom. · At other times, caregivers can use an alcohol-based gel  or soap and water to clean hands. This should be done: ¨ Before and after any contact with you. ¨ After they take off gloves. ¨ Before they handle a device that touches your body (even if gloves are used). ¨ After they touch any objects near you, such as medical equipment, lights, or doorknobs. ¨ Before they handle medicine or prepare food. Proper hand-washing for caregivers · When using an alcohol-based gel , fill your palm with the gel. Then spread it all over your hands. Rub your hands together until they are dry. · When washing hands with soap and water: ¨ Get your hands wet. Then use enough soap to cover your whole hands. ¨ Rub your hands together hard, in a [place]. Be sure that you cover all surfaces. Rub your wrists, the backs of your hands, between your fingers, and under your fingernails. Wash your hands for at least 30 seconds. ¨ Rinse your hands with water. But don't use hot water. It may irritate your hands. ¨ Use a paper towel to hold the faucet handle when you turn off the water. ¨ Dry your hands well with a paper towel. Don't use towels that have been used by others or used more than once. · If you use bar soap, use small bars. Set the soap on a rack that lets water drain. Where can you learn more? Go to http://scooby-morris.info/. Enter U949 in the search box to learn more about \"Hand-Washing: Care Instructions. \" Current as of: May 24, 2016 Content Version: 11.1 © 0159-5968 [employer]. Care instructions adapted under license by [employer] (which disclaims liability or warranty for this information). If you have questions about a medical condition or this instruction, always ask your healthcare professional. [place] [address] any warranty or liability for your use of this information. Secondhand Smoke: Care Instructions Your Care Instructions Secondhand smoke comes from the burning end of a cigarette, cigar, or pipe and the smoke that a smoker exhales. The smoke contains nicotine and many other harmful chemicals. Breathing secondhand smoke can cause or worsen health problems including cancer, asthma, coronary artery disease, and respiratory infections. It can make your eyes and nose burn and cause a sore throat. Secondhand smoke is especially bad for babies and young children whose lungs are still developing. Babies whose parents smoke are more likely to have ear infections, pneumonia, and bronchitis in the first few years of their lives. Secondhand smoke can make asthma symptoms worse in children. If you are pregnant, it is important that you not smoke and that you avoid secondhand smoke. You are more likely to give birth to a baby who weighs less than expected (low birth weight) if you smoke. And your baby may have a greater risk for sudden infant death syndrome (SIDS). Babies whose mothers are exposed to secondhand smoke during pregnancy have a higher risk for health problems. Follow-up care is a key part of your treatment and safety. Be sure to make and go to all appointments, and call your doctor if you are having problems. It's also a good idea to know your test results and keep a list of the medicines you take. How can you care for yourself at home? · Do not smoke or let anyone else smoke in your home. If people must smoke, ask them to go outside. · If people do smoke in your home, choose a room where you can open a window or use a fan to get the smoke outside. · Do not let anyone smoke in your car. If someone must smoke, pull over in a safe place and let him or her smoke away from the car. · Ask your employer to make sure that you have a smoke-free work area. · Make sure that your children are not exposed to secondhand smoke at day care, school, and after-school programs. · Try to choose nonsmoking bars, restaurants, and other public places when you go out. · Help your family and friends who smoke to quit by encouraging them to try. Tell them about treatment resources. Having support from others often helps. · If you smoke, quit. Quitting is hard, but there are ways to boost your chance of quitting tobacco for good. ¨ Use nicotine gum, patches, or lozenges. Call a quitline. Ask your doctor about stop-smoking programs and medicines. ¨ Keep trying. When should you call for help? Watch closely for changes in your health, and be sure to contact your doctor if you have any problems. Where can you learn more? Go to http://scooby-morris.info/. Enter L004 in the search box to learn more about \"Secondhand Smoke: Care Instructions. \" Current as of: May 26, 2016 Content Version: 11.1 © 2102-5010 [employer]. Care instructions adapted under license by [employer] (which disclaims liability or warranty for this information). If you have questions about a medical condition or this instruction, always ask your healthcare professional. [place] [address] any warranty or liability for your use of this information.

## 2017-01-24 NOTE — IP AVS SNAPSHOT
Current Discharge Medication List  
  
Take these medications at their scheduled times Dose & Instructions Dispensing Information Comments Morning Noon Evening Bedtime  
 amLODIPine 10 mg tablet Commonly known as:  Oren Rosenberg Your next dose is: Today, Tomorrow Other:  ____________ Take  by mouth daily. Refills:  0  
     
   
   
   
  
 carvedilol 6.25 mg tablet Commonly known as:  Kristine Lundberg Your next dose is: Today, Tomorrow Other:  ____________ Take  by mouth two (2) times daily (with meals). Refills:  0  
     
   
   
   
  
 cholecalciferol 400 unit Tab tablet Commonly known as:  VITAMIN D3 Your next dose is: Today, Tomorrow Other:  ____________ Take  by mouth daily. Refills:  0  
     
   
   
   
  
 clopidogrel 75 mg Tab Commonly known as:  PLAVIX Your next dose is: Today, Tomorrow Other:  ____________ Dose:  75 mg Take 1 Tab by mouth daily. Quantity:  90 Tab Refills:  3  
     
   
   
   
  
 colchicine 0.6 mg tablet Your next dose is: Today, Tomorrow Other:  ____________ Dose:  0.6 mg Take 0.6 mg by mouth daily. Refills:  0  
     
   
   
   
  
 HOMOCYSTEINE FORMULA PO Your next dose is: Today, Tomorrow Other:  ____________ Dose:  2 Tab Take 2 Tabs by mouth daily. Refills:  0  
     
   
   
   
  
 isosorbide mononitrate ER 60 mg CR tablet Commonly known as:  IMDUR Your next dose is: Today, Tomorrow Other:  ____________ Take  by mouth every morning. Refills:  0  
     
   
   
   
  
 krill oil 500 mg Cap Your next dose is: Today, Tomorrow Other:  ____________ Dose:  1000 mg Take 1,000 mg by mouth two (2) times a day. Refills:  0  
     
   
   
   
  
 LASIX 40 mg tablet Generic drug:  furosemide Your next dose is: Today, Tomorrow Other:  ____________ Dose:  20 mg Take 20 mg by mouth every other day. Refills:  0  
     
   
   
   
  
 lecithin 1,200 mg Cap Your next dose is: Today, Tomorrow Other:  ____________ Take  by mouth three (3) times daily. Refills:  0  
     
   
   
   
  
 levothyroxine 75 mcg tablet Commonly known as:  SYNTHROID Your next dose is: Today, Tomorrow Other:  ____________ Take  by mouth Daily (before breakfast). Refills:  0  
     
   
   
   
  
 magnesium 500 mg Tab Your next dose is: Today, Tomorrow Other:  ____________ Take  by mouth daily. Refills:  0  
     
   
   
   
  
 simvastatin 40 mg tablet Commonly known as:  ZOCOR Your next dose is: Today, Tomorrow Other:  ____________ Dose:  20 mg Take 20 mg by mouth nightly. Refills:  0  
     
   
   
   
  
 sodium bicarbonate 650 mg tablet Your next dose is: Today, Tomorrow Other:  ____________ Take  by mouth three (3) times daily. Refills:  0  
     
   
   
   
  
 valsartan 160 mg tablet Commonly known as:  DIOVAN Your next dose is: Today, Tomorrow Other:  ____________ Dose:  80 mg Take 80 mg by mouth two (2) times a day. Refills:  0  
     
   
   
   
  
 zinc 50 mg capsule Your next dose is: Today, Tomorrow Other:  ____________ Take  by mouth daily. Refills:  0 Take these medications as needed Dose & Instructions Dispensing Information Comments Morning Noon Evening Bedtime  
 nitroglycerin 0.4 mg SL tablet Commonly known as:  NITROSTAT Your next dose is: Today, Tomorrow Other:  ____________ Dose:  0.4 mg  
1 Tab by SubLINGual route every five (5) minutes as needed for Chest Pain. Quantity:  1 Bottle Refills:  3 Take these medications as directed Dose & Instructions Dispensing Information Comments Morning Noon Evening Bedtime CARNITINE PO Your next dose is: Today, Tomorrow Other:  ____________ Dose:  50 mg Take 50 mg by mouth. Refills:  0  
     
   
   
   
  
 coQ10 (ubiquinol) 100 mg Cap Your next dose is: Today, Tomorrow Other:  ____________ Take  by mouth. Refills:  0

## 2017-01-25 ENCOUNTER — APPOINTMENT (OUTPATIENT)
Dept: MRI IMAGING | Age: 82
End: 2017-01-25
Attending: STUDENT IN AN ORGANIZED HEALTH CARE EDUCATION/TRAINING PROGRAM
Payer: MEDICARE

## 2017-01-25 ENCOUNTER — APPOINTMENT (OUTPATIENT)
Dept: ULTRASOUND IMAGING | Age: 82
End: 2017-01-25
Attending: INTERNAL MEDICINE
Payer: MEDICARE

## 2017-01-25 LAB
ANION GAP BLD CALC-SCNC: 11 MMOL/L (ref 7–16)
BASOPHILS # BLD AUTO: 0 K/UL (ref 0–0.2)
BASOPHILS # BLD: 0 % (ref 0–2)
BUN SERPL-MCNC: 30 MG/DL (ref 8–23)
CALCIUM SERPL-MCNC: 8.9 MG/DL (ref 8.3–10.4)
CHLORIDE SERPL-SCNC: 107 MMOL/L (ref 98–107)
CO2 SERPL-SCNC: 26 MMOL/L (ref 21–32)
CREAT SERPL-MCNC: 1.66 MG/DL (ref 0.8–1.5)
DIFFERENTIAL METHOD BLD: ABNORMAL
EOSINOPHIL # BLD: 0.4 K/UL (ref 0–0.8)
EOSINOPHIL NFR BLD: 5 % (ref 0.5–7.8)
ERYTHROCYTE [DISTWIDTH] IN BLOOD BY AUTOMATED COUNT: 14 % (ref 11.9–14.6)
GLUCOSE SERPL-MCNC: 101 MG/DL (ref 65–100)
HCT VFR BLD AUTO: 42.8 % (ref 41.1–50.3)
HGB BLD-MCNC: 13.6 G/DL (ref 13.6–17.2)
IMM GRANULOCYTES # BLD: 0 K/UL (ref 0–0.5)
IMM GRANULOCYTES NFR BLD AUTO: 0.2 % (ref 0–5)
LYMPHOCYTES # BLD AUTO: 23 % (ref 13–44)
LYMPHOCYTES # BLD: 2.2 K/UL (ref 0.5–4.6)
MCH RBC QN AUTO: 32.1 PG (ref 26.1–32.9)
MCHC RBC AUTO-ENTMCNC: 31.8 G/DL (ref 31.4–35)
MCV RBC AUTO: 100.9 FL (ref 79.6–97.8)
MONOCYTES # BLD: 1 K/UL (ref 0.1–1.3)
MONOCYTES NFR BLD AUTO: 11 % (ref 4–12)
NEUTS SEG # BLD: 5.8 K/UL (ref 1.7–8.2)
NEUTS SEG NFR BLD AUTO: 61 % (ref 43–78)
PLATELET # BLD AUTO: 160 K/UL (ref 150–450)
PMV BLD AUTO: 11.4 FL (ref 10.8–14.1)
POTASSIUM SERPL-SCNC: 4.4 MMOL/L (ref 3.5–5.1)
RBC # BLD AUTO: 4.24 M/UL (ref 4.23–5.67)
SODIUM SERPL-SCNC: 144 MMOL/L (ref 136–145)
WBC # BLD AUTO: 9.5 K/UL (ref 4.3–11.1)

## 2017-01-25 PROCEDURE — 74011250637 HC RX REV CODE- 250/637: Performed by: INTERNAL MEDICINE

## 2017-01-25 PROCEDURE — 74011250636 HC RX REV CODE- 250/636: Performed by: INTERNAL MEDICINE

## 2017-01-25 PROCEDURE — 97161 PT EVAL LOW COMPLEX 20 MIN: CPT

## 2017-01-25 PROCEDURE — 99218 HC RM OBSERVATION: CPT

## 2017-01-25 PROCEDURE — 97116 GAIT TRAINING THERAPY: CPT

## 2017-01-25 PROCEDURE — G8978 MOBILITY CURRENT STATUS: HCPCS

## 2017-01-25 PROCEDURE — 93880 EXTRACRANIAL BILAT STUDY: CPT

## 2017-01-25 PROCEDURE — G8989 SELF CARE D/C STATUS: HCPCS

## 2017-01-25 PROCEDURE — G8979 MOBILITY GOAL STATUS: HCPCS

## 2017-01-25 PROCEDURE — 85025 COMPLETE CBC W/AUTO DIFF WBC: CPT | Performed by: INTERNAL MEDICINE

## 2017-01-25 PROCEDURE — G8988 SELF CARE GOAL STATUS: HCPCS

## 2017-01-25 PROCEDURE — 96376 TX/PRO/DX INJ SAME DRUG ADON: CPT

## 2017-01-25 PROCEDURE — 96372 THER/PROPH/DIAG INJ SC/IM: CPT

## 2017-01-25 PROCEDURE — 80048 BASIC METABOLIC PNL TOTAL CA: CPT | Performed by: INTERNAL MEDICINE

## 2017-01-25 PROCEDURE — G8987 SELF CARE CURRENT STATUS: HCPCS

## 2017-01-25 PROCEDURE — 70551 MRI BRAIN STEM W/O DYE: CPT

## 2017-01-25 PROCEDURE — 97162 PT EVAL MOD COMPLEX 30 MIN: CPT

## 2017-01-25 PROCEDURE — G8980 MOBILITY D/C STATUS: HCPCS

## 2017-01-25 PROCEDURE — 36415 COLL VENOUS BLD VENIPUNCTURE: CPT | Performed by: INTERNAL MEDICINE

## 2017-01-25 PROCEDURE — 97165 OT EVAL LOW COMPLEX 30 MIN: CPT

## 2017-01-25 RX ADMIN — LEVOTHYROXINE SODIUM 75 MCG: 75 TABLET ORAL at 08:55

## 2017-01-25 RX ADMIN — FUROSEMIDE 20 MG: 20 TABLET ORAL at 08:55

## 2017-01-25 RX ADMIN — HEPARIN SODIUM 5000 UNITS: 5000 INJECTION, SOLUTION INTRAVENOUS; SUBCUTANEOUS at 02:08

## 2017-01-25 RX ADMIN — CLOPIDOGREL BISULFATE 75 MG: 75 TABLET ORAL at 08:55

## 2017-01-25 RX ADMIN — Medication 10 ML: at 23:25

## 2017-01-25 RX ADMIN — CARVEDILOL 6.25 MG: 6.25 TABLET, FILM COATED ORAL at 08:55

## 2017-01-25 RX ADMIN — SODIUM BICARBONATE TAB 650 MG 650 MG: 650 TAB at 08:55

## 2017-01-25 RX ADMIN — SIMVASTATIN 20 MG: 10 TABLET, FILM COATED ORAL at 23:21

## 2017-01-25 RX ADMIN — SODIUM BICARBONATE TAB 650 MG 650 MG: 650 TAB at 23:21

## 2017-01-25 RX ADMIN — LORAZEPAM 1 MG: 2 INJECTION INTRAMUSCULAR; INTRAVENOUS at 09:24

## 2017-01-25 RX ADMIN — CARVEDILOL 6.25 MG: 6.25 TABLET, FILM COATED ORAL at 18:23

## 2017-01-25 RX ADMIN — Medication 10 ML: at 05:19

## 2017-01-25 RX ADMIN — SODIUM BICARBONATE TAB 650 MG 650 MG: 650 TAB at 18:23

## 2017-01-25 NOTE — PROGRESS NOTES
[facility] Pt is pulling on wires and has pulled gown off. Soft nahd mitts applied with son's permission. Bed alarm on.

## 2017-01-25 NOTE — PROGRESS NOTES
Pt's son-in-law, Cheli Kaiser is here. Bilateral soft wrist restraints removed and pt and Cheli Kaiser state understanding of not getting out of bed unless staff is here.

## 2017-01-25 NOTE — PROGRESS NOTES
Family member out to desk as pt needs to have a BM. This writer and pt's son-in-law present with pt as he walked to toilet, had BM, and then to recliner. Family member agrees to tell staff if they need to leave pt unattended so a recliner alarm can be retrieved.

## 2017-01-25 NOTE — PROGRESS NOTES
Family out to desk stating that they would like for a doctor to come and re evaluate patient before he is moved to another room. States that he is has become increasingly more confused as the day as progressed. Daughter states that patient does not even recognize her as well. Informed them that I would page doctor and let them know they would like for him to come and reevaluate patient. Paged Dr. Sammi Leyva at this time. Received call back and he states that he is not able to come at this time to reevaluate patient but to let them know \"that it is probably residual from medication given earlier to medicate for MRI and that he would try to come and see them later in the evening. Informed patient and family that I had heard back from doctor and his plan at this time.

## 2017-01-25 NOTE — DISCHARGE SUMMARY
Patient ID:  Louise Merida  [id number]  88 y.o.  5/15/1922  Admit date: 1/24/2017 10:25 AM  Discharge date and time: 1/25/2017  Attending: Cydney Mcelroy MD  PCP:  Clovis Smith MD  Treatment Team: Attending Provider: Cydney Mcelroy MD    Principal Diagnosis Blurred vision, left eye   Principal Problem:    Blurred vision, left eye (1/24/2017)    Active Problems:    Chronic systolic heart failure (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 In summary, the patient is a 81 y/o M who p/w complaint of severe L eye blurred vision that he noticed when he woke up this morning. Denies any other symptoms. Denies eye pain, eye trauma, HA, n/v, focal weakness/numbness, trouble with speech, confusion. Denies similar previous episodes. Denies trouble walking. Some issues with coordination due to vision. Denies hx of CVA/TIA. In on plavix for stenting in past. On statin also. ER called teleneuro who felt pt needed MRI and carotid US to eval for possible CVA. Patient MRI was negative for acute CVA except chronic lacunar infarct, carotid duplex showed bilateral arthrosclerosis without significant stenosis. Renal insufficiency has improved to baseline. He states no longer having blurred vision. We will have him follow up with [facility] for further evaluation and dilatation.     Significant Diagnostic Studies:       Labs: Results:       Chemistry Recent Labs      01/25/17   0335  01/24/17   1120   GLU  101*  122*   NA  144  145   K  4.4  4.3   CL  107  109*   CO2  26  28   BUN  30*  38*   CREA  1.66*  2.09*   CA  8.9  8.4   AGAP  11  8   AP   -- 98   TP   --   6.9   ALB   --   3.4   GLOB   --   3.5   AGRAT   --   1.0*      CBC w/Diff Recent Labs      01/25/17   0335  01/24/17   1120   WBC  9.5  6.3   RBC  4.24  3.78*   HGB  13.6  12.0*   HCT  42.8  38.4*   PLT  160  162   GRANS  61  61   LYMPH  23  25   EOS  5  5      Cardiac Enzymes No results for input(s): CPK, CKND1, STEVE in the last 72 hours. No lab exists for component: CKRMB, TROIP   Coagulation No results for input(s): PTP, INR, APTT in the last 72 hours. No lab exists for component: INREXT    Lipid Panel Lab Results   Component Value Date/Time    Cholesterol, total 103 11/13/2012 05:55 AM    HDL Cholesterol 29 11/13/2012 05:55 AM    LDL, calculated 53.8 11/13/2012 05:55 AM    VLDL, calculated 20.2 11/13/2012 05:55 AM    Triglyceride 101 11/13/2012 05:55 AM    CHOL/HDL Ratio 3.6 11/13/2012 05:55 AM      BNP No results for input(s): BNPP in the last 72 hours. Liver Enzymes Recent Labs      01/24/17   1120   TP  6.9   ALB  3.4   AP  98   SGOT  22      Thyroid Studies No results found for: T4, T3U, TSH, TSHEXT       Results for orders placed or performed during the hospital encounter of 01/24/17   EKG, 12 LEAD, INITIAL   Result Value Ref Range    Systolic BP  mmHg    Diastolic BP  mmHg    Ventricular Rate 50 BPM    Atrial Rate 50 BPM    P-R Interval  ms    QRS Duration 144 ms    Q-T Interval 484 ms    QTC Calculation (Bezet) 441 ms    Calculated P Axis 116 degrees    Calculated R Axis -79 degrees    Calculated T Axis 165 degrees    Diagnosis       !! AGE AND GENDER SPECIFIC ECG ANALYSIS !!   Sinus bradycardia  Non-specific intra-ventricular conduction block  Cannot rule out Anteroseptal infarct , age undetermined  Abnormal ECG  When compared with ECG of 14-NOV-2012 07:13,  Questionable change in initial forces of Lateral leads  Nonspecific T wave abnormality now evident in Inferior leads  Confirmed by Dmitri Oreilly MD (), OLIVA REDD ([id number]) on 1/24/2017 2:07:58 PM       CT Results (most recent):    Results from [place] encounter on 01/24/17   CT HEAD WO CONT   Narrative History: Vision changes, blurriness in the left eye. Exam: CT head without contrast    Technique: Thin section axial CT images were obtained from the skullbase through  the vertex. Radiation dose reduction techniques were used for this study. Our  CT scanners use one or all of the following: Automated exposure control,  adjustment of the mA and/or kV according to patient size, use of iterative  reconstruction. Findings: The ventricles are normal in size, shape, and position. There is  generalized cerebral atrophy with chronic appearing white matter change in the  corona radiata and centrum semiovale. No extra-axial fluid collection is  present. There is no mass or mass-effect. The basilar cisterns are patent. The  paranasal sinuses and mastoid air cells are clear. Impression Impression: Chronic appearing white matter change without acute intracranial  abnormality. There is generalized cerebral atrophy as well. VAS/US Results (most recent):    Results from Hospital Encounter encounter on 01/24/17   DUPLEX CAROTID BILATERAL   Narrative ULTRASOUND BILATERAL CAROTID AND VERTEBRAL ARTERIES. COMPARISON: None. HISTORY: Carotid stenosis    RIGHT:  The peak systolic velocity within the common carotid artery (CCA) = 58  cm/sec; internal carotid artery (ICA)  = 83 cm/sec. ICA/CCA peak systolic  velocity ratio = 1.4. Mild plaque formation. No significant stenosis. Antegrade flow in the  vertebral artery. LEFT:  The peak systolic velocity within the common carotid artery (CCA) = 57  cm/sec; internal carotid artery (ICA)  = 71 cm/sec. ICA/CCA peak systolic  velocity ratio = 1.3. Mild plaque formation. No significant stenosis. Antegrade flow in the  vertebral artery. Impression IMPRESSION:  Bilateral atherosclerosis without evidence of significant  stenosis. .        XR Results (most recent):    Results from [place] encounter on 01/24/17   XR CHEST PA LAT   Narrative History: chest pain    Two views chest    COMPARISON: 11/12/2012    Findings: Stable postoperative changes noted. The lungs are well expanded and  clear. The cardiac silhouette, and mediastinal contour, and osseous structures  are stable. Impression Impression: Stable two-view chest. No acute abnormality. Discharge Exam:  Visit Vitals    /54    Pulse 67    Temp 98.1 °F (36.7 °C)    Resp 22    Ht 5' 10\" (1.778 m)    Wt 62.8 kg (138 lb 6.4 oz)    SpO2 93%    BMI 19.86 kg/m2     General appearance: alert, cooperative, no distress, appears stated age  Lungs: clear to auscultation bilaterally  Heart: regular rate and rhythm, S1, S2 normal, no murmur, click, rub or gallop  Abdomen: soft, non-tender. Bowel sounds normal. No masses,  no organomegaly  Extremities: no cyanosis or edema  Neurologic: Grossly normal mildly confused, no appreciable deficits    Disposition: home  Discharge Condition: stable  Patient Instructions:   Current Discharge Medication List      CONTINUE these medications which have NOT CHANGED    Details   clopidogrel (PLAVIX) 75 mg tablet Take 1 Tab by mouth daily. 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 nitroglycerin (NITROSTAT) 0.4 mg SL tablet 1 Tab by SubLINGual route every five (5) minutes as needed for Chest Pain. Qty: 1 Bottle, Refills: 3      furosemide (LASIX) 40 mg tablet Take 20 mg by mouth every other day. isosorbide mononitrate ER (IMDUR) 60 mg CR tablet Take  by mouth every morning. amLODIPine (NORVASC) 10 mg tablet Take  by mouth daily.         sodium bicarbonate 650 mg tablet Take  by mouth three (3) times daily. simvastatin (ZOCOR) 40 mg tablet Take 20 mg by mouth nightly. carvedilol (COREG) 6.25 mg tablet Take  by mouth two (2) times daily (with meals). CYANOCOBALAMIN/FA/PYRIDOXINE (HOMOCYSTEINE FORMULA PO) Take 2 Tabs by mouth daily. krill oil 500 mg cap Take 1,000 mg by mouth two (2) times a day. lecithin 1,200 mg cap Take  by mouth three (3) times daily. cholecalciferol (VITAMIN D3) 400 unit Tab tablet Take  by mouth daily. colchicine 0.6 mg tablet Take 0.6 mg by mouth daily. magnesium 500 mg Tab Take  by mouth daily. zinc 50 mg capsule Take  by mouth daily. levothyroxine (SYNTHROID) 75 mcg tablet Take  by mouth Daily (before breakfast). LEVOCARNITINE (CARNITINE PO) Take 50 mg by mouth.        coQ10, ubiquinol, 100 mg cap Take  by mouth.                Activity: Activity as tolerated  Diet: Cardiac Diet  Wound Care: None needed    Follow-up  ·   [facility] 3 days  · PCP 2-3 days  Time spent to discharge patient 40 minutes  Signed:  Brittany Lau MD  1/25/2017  12:54 PM

## 2017-01-25 NOTE — PROGRESS NOTES
Problem: Mobility Impaired (Adult and Pediatric)  Goal: *Acute Goals and Plan of Care (Insert Text)  STG:  (1.)Mr. Lorenzo will move from supine to sit and sit to supine with CONTACT GUARD ASSIST within 4 day(s). (2.)Mr. Lorenzo will transfer from bed to chair and chair to bed with CONTACT GUARD ASSIST using the least restrictive device within 4 day(s). (3.)Mr. Lorenzo will ambulate with CONTACT GUARD ASSIST for 50 feet with the least restrictive device within 4 day(s). (4.)pt. Will increase B LE strength 1/2 grade within 4 days      ________________________________________________________________________________________________      PHYSICAL THERAPY: INITIAL ASSESSMENT, AM 1/25/2017  OBSERVATION: Hospital Day: 2  Payor: /      NAME/AGE/GENDER: Antoinette Villa is a 80 y.o. male             PRIMARY DIAGNOSIS: blurred vision Blurred vision, left eye Blurred vision, left eye        ICD-10: Treatment Diagnosis:       · Generalized Muscle Weakness (M62.81)  · Difficulty in walking, Not elsewhere classified (R26.2)  · Other abnormalities of gait and mobility (R26.89)   Precaution/Allergies:  Review of patient's allergies indicates no known allergies. ASSESSMENT:      Mr. Tavon Fry presents with decreased LE strength, standing balance, functional mobility and safety awareness. He is admitted with blurriness in the L eye. He would benefit from further PT while here. His son in law states that he had received some OP PT for balance and shuffling gait in Dec. At [facility]. The pt. Lives in his own home, but family are with him 24 hrs a d ay. He is able to ambulate and complete his bathing and dressing on his own. Family prepares meals and supervises him      This section established at most recent assessment   PROBLEM LIST (Impairments causing functional limitations):  1. Decreased Strength  2. Decreased Transfer Abilities  3. Decreased Ambulation Ability/Technique  4. Decreased Balance  5.  Decreased Activity Tolerance  6. Decreased Flexibility/Joint Mobility  7. Decreased Brooke with Home Exercise Program  8. Decreased Cognition    INTERVENTIONS PLANNED: (Benefits and precautions of physical therapy have been discussed with the patient.)  1. Balance Exercise  2. Bed Mobility  3. Family Education  4. Gait Training  5. Home Exercise Program (HEP)  6. Neuromuscular Re-education/Strengthening  7. Range of Motion (ROM)  8. Therapeutic Activites  9. Therapeutic Exercise/Strengthening  10. Transfer Training  11. Group Therapy      TREATMENT PLAN: Frequency/Duration: daily for 1 week  Rehabilitation Potential For Stated Goals: FAIR      RECOMMENDED REHABILITATION/EQUIPMENT: (at time of discharge pending progress): Continue Skilled Therapy and probable  PT if family is willing to take him back and provide supervision. otherwise may benefit from stay in rehab. HISTORY:   History of Present Injury/Illness (Reason for Referral): Admitted with blurred vision in L eye. Has had some balance issues when on feet over last couple of months  Past Medical History/Comorbidities:   Mr. Monika Ewing  has a past medical history of Angina  (6/10/2016); Arthritis; CAD (coronary artery disease); Cancer ([address] Chronic kidney disease; GERD (gastroesophageal reflux disease); Heart failure ([address] Hypertension; Orthopnea; Other ill-defined conditions(799.89); Syncope, near; and Thyroid disease. He also has no past medical history of Aneurysm ([address] Arrhythmia; Autoimmune disease ([address] Chronic pain; Liver disease; Morbid obesity ([address] Psychiatric disorder; PUD (peptic ulcer disease); Seizures ([address] Thromboembolus ([address] or Unspecified sleep apnea. Mr. Monika Ewing  has a past surgical history that includes gi; appendectomy; other surgical; cardiac surg procedure unlist; and heent.   Social History/Living Environment:   Home Environment: Private residence  # Steps to Enter: 3  Wheelchair Ramp: Yes  One/Two Story Residence: One story  Living Alone: Yes (sons stay )  Support Systems: Child(shreya), Family member(s)  Patient Expects to be Discharged to[de-identified] Private residence  Current DME Used/Available at Home: Arzella Maudlin, rolling, Cane, straight  Tub or Shower Type: Shower  Prior Level of Function/Work/Activity:  Lives in his own home but has 4 hr supervision. He is able to bathe and dress himself and walks using a cane or walker at times. Number of Personal Factors/Comorbidities that affect the Plan of Care: 1-2: MODERATE COMPLEXITY   EXAMINATION:   Most Recent Physical Functioning:   Gross Assessment:  AROM: Generally decreased, functional (B LEs)  Strength: Generally decreased, functional (B hips 3+; knees/ankles 3+-4/5)  Sensation: Intact (B LEs)               Posture:  Posture Assessment: Forward head, Rounded shoulders (leans posteriorly)  Balance:  Sitting: Intact  Standing: Pull to stand; With support Bed Mobility:  Supine to Sit: Minimum assistance  Sit to Supine: Minimum assistance  Wheelchair Mobility:     Transfers:  Sit to Stand: Minimum assistance  Stand to Sit: Minimum assistance  Gait:     Base of Support: Narrowed  Speed/Cata: Shuffled; Slow  Gait Abnormalities: Path deviations;Decreased step clearance;Shuffling gait (just medicated with Ativan)  Distance (ft): 3 Feet (ft)  Assistive Device:  (HHA of 2)  Ambulation - Level of Assistance: Minimal assistance;Assist x2  Interventions: Safety awareness training;Verbal cues       Body Structures Involved:  1. Joints  2. Muscles Body Functions Affected:  1. Neuromusculoskeletal  2. Movement Related Activities and Participation Affected:  1.  Mobility   Number of elements that affect the Plan of Care: 4+: HIGH COMPLEXITY   CLINICAL PRESENTATION:   Presentation: Evolving clinical presentation with changing clinical characteristics: MODERATE COMPLEXITY   CLINICAL DECISION MAKIN [facility] Inpatient Short Form  How much difficulty does the patient currently have. .. Unable A Lot A Little None   1. Turning over in bed (including adjusting bedclothes, sheets and blankets)? [ ] 1   [ ] 2   [X] 3   [ ] 4   2. Sitting down on and standing up from a chair with arms ( e.g., wheelchair, bedside commode, etc.)   [ ] 1   [ ] 2   [X] 3   [ ] 4   3. Moving from lying on back to sitting on the side of the bed? [ ] 1   [ ] 2   [X] 3   [ ] 4   How much help from another person does the patient currently need. .. Total A Lot A Little None   4. Moving to and from a bed to a chair (including a wheelchair)? [ ] 1   [X] 2   [ ] 3   [ ] 4   5. Need to walk in hospital room? [ ] 1   [X] 2   [ ] 3   [ ] 4   6. Climbing 3-5 steps with a railing? [X] 1   [ ] 2   [ ] 3   [ ] 4   © 2007, Trustees of [address] [place] [address], under license to [employer]. All rights reserved    Score:  Initial: 14 Most Recent: X (Date: -- )     Interpretation of Tool:  Represents activities that are increasingly more difficult (i.e. Bed mobility, Transfers, Gait). Score 24 23 22-20 19-15 14-10 9-7 6       Modifier CH CI CJ CK CL CM CN         · Mobility - Walking and Moving Around:               - CURRENT STATUS:    CL - 60%-79% impaired, limited or restricted               - GOAL STATUS:           CK - 40%-59% impaired, limited or restricted               - D/C STATUS:                       CL - 60%-79% impaired, limited or restricted  Payor: /       Medical Necessity:     · Patient demonstrates fair rehab potential due to higher previous functional level. Reason for Services/Other Comments:  · Patient continues to require present interventions due to patient's inability to perform functional mobility independently.    Use of outcome tool(s) and clinical judgement create a POC that gives a: Questionable prediction of patient's progress: MODERATE COMPLEXITY                 TREATMENT:   (In addition to Assessment/Re-Assessment sessions the following treatments were rendered)   Pre-treatment Symptoms/Complaints:  Just medicated with Ativan. Is lethargic  Pain: Initial:   Pain Intensity 1: 0  Post Session:  0      Assessment/Reassessment only, no treatment provided today     Treatment/Session Assessment:    · Response to Treatment:  Tolerated well even medicated. Left in recliner with son in law at bedside  · Interdisciplinary Collaboration:  · Physical Therapist  · Occupational Therapist  · Registered Nurse  · After treatment position/precautions:  · Up in chair  · Bed/Chair-wheels locked  · Call light within reach  · RN notified  · Family at bedside  · Compliance with Program/Exercises: Will assess as treatment progresses. · Recommendations/Intent for next treatment session: \"Next visit will focus on advancements to more challenging activities and reduction in assistance provided\".   Total Treatment Duration:  PT Patient Time In/Time Out  Time In: 0935  Time Out: 1105 [facility],

## 2017-01-25 NOTE — PROGRESS NOTES
Spiritual Care Assessment/Progress Notes    Moraima Ken [id number]  [id number]    5/15/1922  80 y.o.  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 ([facility] 06/15/2016    Chronic systolic heart failure ([facility] 11/12/2012    Hypothyroidism 11/12/2012    Essential hypertension, benign 11/12/2012    CKD (chronic kidney disease) stage 3, GFR 30-59 ml/min 11/12/2012        Date: 1/25/2017       Level of Zoroastrian/Spiritual Activity:  []         Involved in nader tradition/spiritual practice    []         Not involved in nader tradition/spiritual practice  [x]         Spiritually oriented    []         Claims no spiritual orientation    []         seeking spiritual identity  []         Feels alienated from Gnosticism practice/tradition  []         Feels angry about Gnosticism practice/tradition  [x]         Spirituality/Gnosticism tradition is a resource for coping at this time.   []         Not able to assess due to medical condition    Services Provided Today:  []         crisis intervention    []         reading Scriptures  [x]         spiritual assessment    []         prayer  []         empathic listening/emotional support  []         rites and rituals (cite in comments)  []         life review     []         Gnosticism support  []         theological development   []         advocacy  []         ethical dialog     []         blessing  []         bereavement support    []         support to family  []         anticipatory grief support   [] help with AMD  []         spiritual guidance    []         meditation      Spiritual Care Needs  []         Emotional Support  []         Spiritual/Yazidi Care  []         Loss/Adjustment  []         Advocacy/Referral                /Ethics  []         No needs expressed at               this time  []         Other: (note in               comments)  [address]  []         Follow up visits with               pt/family  []         Provide materials  []         Schedule sacraments  []         Contact Community               Clergy  []         Follow up as needed  []         Other: (note in               comments)     Comments: Spiritual care assessment completed. Patient is Jainism in nader and is a Full Code.      Lilliam Balderas

## 2017-01-25 NOTE — PROGRESS NOTES
Problem: Self Care Deficits Care Plan (Adult)  Goal: *Acute Goals and Plan of Care (Insert Text)  1. Patient will perform grooming with supervision. 2. Patient will perform Upper body dressing with supervision  3. Patient will perform lower body dressing with CGA  4. Patient will perform upper and lower body bathing with supervision seated on shower chair  5. Patient will perform toilet transfers with CGA. 6. Patient will perform shower transfer with CGA. 7. Patient will participate in 30 + minutes of ADL/ therapeutic exercise/therapeutic activity with min rest breaks to increase activity tolerance for self care. 8. Patient will perform ADL functional mobility in room with CGA. Goals to be achieved in 7 days. OCCUPATIONAL THERAPY: Initial Assessment and AM 1/25/2017  OBSERVATION: Hospital Day: 2  Payor: Dylan Swanson / Plan: [facility] HUMANA MEDICARE FFS / Product Type: [employer] Medicare /      NAME/AGE/GENDER: Hollie Amaya is a 80 y.o. male             PRIMARY DIAGNOSIS:  blurred vision Blurred vision, left eye Blurred vision, left eye        ICD-10: Treatment Diagnosis:        · Generalized Muscle Weakness (M62.81)  · Other lack of cordination (R27.8)   Precautions/Allergies:         Review of patient's allergies indicates no known allergies. ASSESSMENT:      Mr. Anisha Estrada presents in recliner having toileted with nursing. Son in law present. He lives in his house and has 24 care from his family. He was mod I with dressing and bathing. He is a very active 80year old. Goes out daily for meals and walks outside with a cane and supervision from son in law. He gave up driving 3 months ago. He presents with posterior lean with standing tasks and decreased self care skills. He plans to return home with family to assist. He would benefit from skilled OT services will follow.       This section established at most recent assessment   PROBLEM LIST (Impairments causing functional limitations):  1. Decreased Strength  2. Decreased ADL/Functional Activities  3. Decreased Transfer Abilities  4. Decreased Ambulation Ability/Technique  5. Decreased Balance  6. Decreased Activity Tolerance    INTERVENTIONS PLANNED: (Benefits and precautions of occupational therapy have been discussed with the patient.)  1. Activities of daily living training  2. Adaptive equipment training  3. Balance training  4. Clothing management  5. Neuromuscular re-eduation  6. Theraputic activity  7. Theraputic exercise  8. Home safety and DME recommendations      TREATMENT PLAN: Frequency/Duration: Follow patient 2 times per week to address above goals. Rehabilitation Potential For Stated Goals: GOOD      RECOMMENDED REHABILITATION/EQUIPMENT: (at time of discharge pending progress): Continue Skilled Therapy and Home Health: Physical Therapy and Occupational Therapy. OCCUPATIONAL PROFILE AND HISTORY:   History of Present Injury/Illness (Reason for Referral):  Decreased self care and functional mobility skills  Past Medical History/Comorbidities:   Mr. Beverly Spencer  has a past medical history of Angina  (6/10/2016); Arthritis; CAD (coronary artery disease); Cancer ([address] Chronic kidney disease; GERD (gastroesophageal reflux disease); Heart failure ([address] Hypertension; Orthopnea; Other ill-defined conditions(799.89); Syncope, near; and Thyroid disease. He also has no past medical history of Aneurysm ([address] Arrhythmia; Autoimmune disease ([address] Chronic pain; Liver disease; Morbid obesity ([address] Psychiatric disorder; PUD (peptic ulcer disease); Seizures ([address] Thromboembolus ([address] or Unspecified sleep apnea. Mr. Beverly Spencer  has a past surgical history that includes gi; appendectomy; other surgical; cardiac surg procedure unlist; and heent.   Social History/Living Environment:   Home Environment: Private residence  # Steps to Enter: 3  Wheelchair Ramp: Yes  One/Two Story Residence: One story  Living Alone: Yes (sons stay 24 /7)  Support Systems: Child(shreya), Family member(s)  Patient Expects to be Discharged to[de-identified] Private residence  Current DME Used/Available at Home: [place] Citrin, rolling, Cane, straight  Tub or Shower Type: Shower  Prior Level of Function/Work/Activity:  Mod I to supervision with ADLS and left the home daily with family used st. cane      Number of Personal Factors/Comorbidities that affect the Plan of Care: Brief history (0):  LOW COMPLEXITY   ASSESSMENT OF OCCUPATIONAL PERFORMANCE[de-identified]   Activities of Daily Living:           Basic ADLs (From Assessment) Complex ADLs (From Assessment)   Basic ADL  Feeding: Setup  Oral Facial Hygiene/Grooming: Supervision  Bathing: Minimum assistance  Upper Body Dressing: Minimum assistance  Lower Body Dressing: Minimum assistance, Moderate assistance (donned B socks seated in recliner min assist)  Toileting: Minimum assistance, Moderate assistance (for standing balance and transfer)     Grooming/Bathing/Dressing Activities of Daily Living     Cognitive Retraining  Safety/Judgement: Decreased awareness of need for safety; Fall prevention                 Functional Transfers  Toilet Transfer : Minimum assistance; Moderate assistance     Bed/Mat Mobility  Supine to Sit: Minimum assistance  Sit to Supine: Minimum assistance  Sit to Stand: Minimum assistance          Most Recent Physical Functioning:   Gross Assessment:                  Posture:  Posture Assessment: Forward head, Rounded shoulders (leans posteriorly)  Balance:  Sitting: Intact  Standing: Pull to stand; With support Bed Mobility:  Supine to Sit: Minimum assistance  Sit to Supine: Minimum assistance  Wheelchair Mobility:     Transfers:  Sit to Stand: Minimum assistance  Stand to Sit: Minimum assistance                 Patient Vitals for the past 6 hrs:       BP SpO2 Pulse   01/25/17 0634 152/73 96 % 65   01/25/17 0734 141/63 93 % 62   01/25/17 0812 - 93 % -   01/25/17 1038 115/54 - 67        Mental Status  Neurologic State: Alert  Orientation Level: Oriented to person  Cognition: Follows commands  Perception: Appears intact  Perseveration: No perseveration noted  Safety/Judgement: Decreased awareness of need for safety, Fall prevention                               Physical Skills Involved:  1. Range of Motion  2. Balance  3. Mobility  4. Strength  5. Fine or Gross Motor Coordination Cognitive Skills Affected (resulting in the inability to perform in a timely and safe manner):  1. Attending  2. Perceiving  3. Problem Solving  4. Remembering Psychosocial Skills Affected:  1. Habits  2. Routines and Behaviors   Number of elements that affect the Plan of Care: 5+:  HIGH COMPLEXITY   CLINICAL DECISION MAKIN[address] [place] [address] AM-PAC 6 Clicks   Basic Mobility Inpatient Short Form  How much help from another person does the patient currently need. .. Total A Lot A Little None   1. Putting on and taking off regular lower body clothing?   [ ] 1   [X] 2   [ ] 3   [ ] 4   2. Bathing (including washing, rinsing, drying)? [ ] 1   [ ] 2   [X] 3   [ ] 4   3. Toileting, which includes using toilet, bedpan or urinal?   [ ] 1   [X] 2   [ ] 3   [ ] 4   4. Putting on and taking off regular upper body clothing?   [ ] 1   [ ] 2   [X] 3   [ ] 4   5. Taking care of personal grooming such as brushing teeth? [ ] 1   [ ] 2   [X] 3   [ ] 4   6. Eating meals? [ ] 1   [ ] 2   [ ] 3   [X] 4   © , Trustees of [address] [place] [address], under license to [employer]. All rights reserved    Score:  Initial:17 Most Recent: X (Date: -- )     Interpretation of Tool:  Represents activities that are increasingly more difficult (i.e. Bed mobility, Transfers, Gait).        Score 24 23 22-20 19-15 14-10 9-7 6       Modifier CH CI CJ CK CL CM CN         · Self Care:               - CURRENT STATUS:    CK - 40%-59% impaired, limited or restricted               - GOAL STATUS:           CJ - 20%-39% impaired, limited or restricted  - D/C STATUS:                       CK - 40%-59% impaired, limited or restricted  Payor: Katlyn Reilly / Plan: [address] [place] [address] HMO / Product Type: Managed Care Medicare /       Medical Necessity:     · Patient is expected to demonstrate progress in balance, coordination and functional technique to decrease assistance required with self care and functional mobility and improve safety during self care and functional mobility. Reason for Services/Other Comments:  · Patient continues to require skilled intervention due to decreased self care and functional mobility. Use of outcome tool(s) and clinical judgement create a POC that gives a: MODERATE COMPLEXITY             TREATMENT:   (In addition to Assessment/Re-Assessment sessions the following treatments were rendered)      Pre-treatment Symptoms/Complaints:  none  Pain: Initial:   Pain Intensity 1: 0  Post Session:  0/10      Assessment/Reassessment only, no treatment provided today     Treatment/Session Assessment:         Response to Treatment:  Tolerated well, confused. Interdisciplinary Collaboration:   · Occupational Therapist  · Registered Nurse     After treatment position/precautions:   · Up in chair  · Bed/Chair-wheels locked  · Bed in low position  · Call light within reach  · RN notified  · Family at bedside  · family present and educated to let nursing know if he had to leave the patient. Compliance with Program/Exercises: Will assess as treatment progresses. Recommendations/Intent for next treatment session:   \"Next visit will focus on advancements to more challenging activities and reduction in assistance provided\"       Total Treatment Duration:  OT Patient Time In/Time Out  Time In: 0825  Time Out: 1940 Mervin Santiago OT

## 2017-01-25 NOTE — PROGRESS NOTES
He continues to try to get out of bed without assistance. Bed alarm of some help, but pt doesn't respond to redirection. He states he is ready to go back home and he is unable to tell staff that he is at the hospital.  He is aware that hie left eye had a vision problem yesterday, but visual field testing is difficult and inconsistent. When two staff ambulate him to the toilet, he immediately starts to fall backwards. He is unaware of it.

## 2017-01-25 NOTE — PROGRESS NOTES
Pt with both legs over rails and almost out of bed. Bed alarm is on. Call to MD to request soft wrist restraints. Son, Ilia Gerber, aware of need for restraints for safety.

## 2017-01-25 NOTE — PROGRESS NOTES
Problem: Mobility Impaired (Adult and Pediatric)  Goal: *Acute Goals and Plan of Care (Insert Text)  STG:  (1.)Mr. Lorenzo will move from supine to sit and sit to supine with CONTACT GUARD ASSIST within 4 day(s). (2.)Mr. Lorenzo will transfer from bed to chair and chair to bed with CONTACT GUARD ASSIST using the least restrictive device within 4 day(s). (3.)Mr. Lorenzo will ambulate with CONTACT GUARD ASSIST for 50 feet with the least restrictive device within 4 day(s). (4.)pt. Will increase B LE strength 1/2 grade within 4 days      ________________________________________________________________________________________________      PHYSICAL THERAPY: Daily Note, Treatment Day: Day of Assessment and PM 1/25/2017  OBSERVATION: Hospital Day: 2  Payor: Annette Simmonds / Plan: [place] [employer] FFS / Product Type: [employer] Medicare /      NAME/AGE/GENDER: Jessica Woodall is a 80 y.o. male             PRIMARY DIAGNOSIS: blurred vision Blurred vision, left eye Blurred vision, left eye        ICD-10: Treatment Diagnosis:       · Generalized Muscle Weakness (M62.81)  · Difficulty in walking, Not elsewhere classified (R26.2)  · Other abnormalities of gait and mobility (R26.89)   Precaution/Allergies:  Review of patient's allergies indicates no known allergies. ASSESSMENT:      Mr. Beverly Spencer presents with decreased LE strength, standing balance, functional mobility and safety awareness. He is admitted with blurriness in the L eye. He would benefit from further PT while here. His son in law states that he had received some OP PT for balance and shuffling gait in Dec. [facility]. The pt. Lives in his own home, but family are with him 24 hrs a day. He is able to ambulate and complete his bathing and dressing on his own. Family prepares meals and supervises him  I was called back to pt's room to assess his gait since the Ativan is wearing off. Pt. Still requires assist to come to standing.  Once standing he leans posteriorly. Can't follow cues to hold onto walker properly. Son in law states that this is not his baseline. He is fidgeting with his hands. I feel like he needs another session tomorrow after meds have completely worn off to assess his ability and safety. This section established at most recent assessment   PROBLEM LIST (Impairments causing functional limitations):  1. Decreased Strength  2. Decreased Transfer Abilities  3. Decreased Ambulation Ability/Technique  4. Decreased Balance  5. Decreased Activity Tolerance  6. Decreased Flexibility/Joint Mobility  7. Decreased [place] with Home Exercise Program  8. Decreased Cognition    INTERVENTIONS PLANNED: (Benefits and precautions of physical therapy have been discussed with the patient.)  1. Balance Exercise  2. Bed Mobility  3. Family Education  4. Gait Training  5. Home Exercise Program (HEP)  6. Neuromuscular Re-education/Strengthening  7. Range of Motion (ROM)  8. Therapeutic Activites  9. Therapeutic Exercise/Strengthening  10. Transfer Training  11. Group Therapy      TREATMENT PLAN: Frequency/Duration: daily for 1 week  Rehabilitation Potential For Stated Goals: FAIR      RECOMMENDED REHABILITATION/EQUIPMENT: (at time of discharge pending progress): Continue Skilled Therapy and probable  PT if family is willing to take him back and provide supervision. otherwise may benefit from stay in rehab. HISTORY:   History of Present Injury/Illness (Reason for Referral): Admitted with blurred vision in L eye. Has had some balance issues when on feet over last couple of months  Past Medical History/Comorbidities:   Mr. Shell Valentin  has a past medical history of Angina  (6/10/2016); Arthritis; CAD (coronary artery disease); Cancer ([address] Chronic kidney disease; GERD (gastroesophageal reflux disease); Heart failure ([address] Hypertension; Orthopnea; Other ill-defined conditions(799.89); Syncope, near; and Thyroid disease.  He also has no past medical history of Aneurysm ([facility] Arrhythmia; Autoimmune disease ([facility] Chronic pain; Liver disease; Morbid obesity ([facility] Psychiatric disorder; PUD (peptic ulcer disease); Seizures ([facility] Thromboembolus ([address] [facility] or Unspecified sleep apnea. Mr. Philip Whitehead  has a past surgical history that includes gi; appendectomy; other surgical; cardiac surg procedure unlist; and heent. Social History/Living Environment:   Home Environment: Private residence  # Steps to Enter: 3  Wheelchair Ramp: Yes  One/Two Story Residence: One story  Living Alone: Yes (sons stay 24 /7)  Support Systems: Child(shreya), Family member(s)  Patient Expects to be Discharged to[de-identified] Private residence  Current DME Used/Available at Home: [employer], rolling, Cane, straight  Tub or Shower Type: Shower  Prior Level of Function/Work/Activity:  Lives in his own home but has 4 hr supervision. He is able to bathe and dress himself and walks using a cane or walker at times. Number of Personal Factors/Comorbidities that affect the Plan of Care: 1-2: MODERATE COMPLEXITY   EXAMINATION:   Most Recent Physical Functioning:   Gross Assessment:  AROM: Generally decreased, functional (B LEs)  Strength: Generally decreased, functional (B hips 3+; knees/ankles 3+-4/5)  Sensation: Intact (B LEs)               Posture:  Posture Assessment: Forward head, Rounded shoulders (leans posteriorly)  Balance:  Sitting: Intact  Standing: Pull to stand; With support Bed Mobility:  Supine to Sit: Minimum assistance  Sit to Supine: Minimum assistance  Wheelchair Mobility:     Transfers:  Sit to Stand: Minimum assistance  Stand to Sit: Minimum assistance  Stand Pivot Transfers: Minimal assistance  Gait:     Base of Support: Narrowed  Speed/Cata: Shuffled; Slow  Gait Abnormalities: Decreased step clearance; Path deviations  Distance (ft): 3 Feet (ft)  Assistive Device: Walker, rolling  Ambulation - Level of Assistance: Minimal assistance  Interventions: Safety awareness training;Verbal cues; Visual/Demos  Duration: 10 Minutes       Body Structures Involved:  1. Joints  2. Muscles Body Functions Affected:  1. Neuromusculoskeletal  2. Movement Related Activities and Participation Affected:  1. Mobility   Number of elements that affect the Plan of Care: 4+: HIGH COMPLEXITY   CLINICAL PRESENTATION:   Presentation: Evolving clinical presentation with changing clinical characteristics: MODERATE COMPLEXITY   CLINICAL DECISION MAKIN [facility] Inpatient Short Form  How much difficulty does the patient currently have. .. Unable A Lot A Little None   1. Turning over in bed (including adjusting bedclothes, sheets and blankets)? [ ] 1   [ ] 2   [X] 3   [ ] 4   2. Sitting down on and standing up from a chair with arms ( e.g., wheelchair, bedside commode, etc.)   [ ] 1   [ ] 2   [X] 3   [ ] 4   3. Moving from lying on back to sitting on the side of the bed? [ ] 1   [ ] 2   [X] 3   [ ] 4   How much help from another person does the patient currently need. .. Total A Lot A Little None   4. Moving to and from a bed to a chair (including a wheelchair)? [ ] 1   [X] 2   [ ] 3   [ ] 4   5. Need to walk in hospital room? [ ] 1   [X] 2   [ ] 3   [ ] 4   6. Climbing 3-5 steps with a railing? [X] 1   [ ] 2   [ ] 3   [ ] 4   © , Trustees of [address] [place] [address], under license to [employer]. All rights reserved    Score:  Initial: 14 Most Recent: X (Date: -- )     Interpretation of Tool:  Represents activities that are increasingly more difficult (i.e. Bed mobility, Transfers, Gait).        Score 24 23 22-20 19-15 14-10 9-7 6       Modifier CH CI CJ CK CL CM CN         · Mobility - Walking and Moving Around:               - CURRENT STATUS:    CL - 60%-79% impaired, limited or restricted               - GOAL STATUS:           CK - 40%-59% impaired, limited or restricted               - D/C STATUS:                       CL - 60%-79% impaired, limited or restricted  Payor: Azeb Long / [place] [facility] [place]IC[place] FFS / Product Type: Managed Care Medicare /       Medical Necessity:     · Patient demonstrates fair rehab potential due to higher previous functional level. Reason for Services/Other Comments:  · Patient continues to require present interventions due to patient's inability to perform functional mobility independently. Use of outcome tool(s) and clinical judgement create a POC that gives a: Questionable prediction of patient's progress: MODERATE COMPLEXITY                 TREATMENT:   (In addition to Assessment/Re-Assessment sessions the following treatments were rendered)   Pre-treatment Symptoms/Complaints:  Still fidgeting with hands, hard time following commands on proper sit to stand  Pain: Initial:   Pain Intensity 1: 0  Post Session:  0      Gait Training (10 Minutes):  Gait training to improve and/or restore physical functioning as related to mobility, strength, balance and coordination. Ambulated 3 Feet (ft) with Minimal assistance using a Walker, rolling and moderate Safety awareness training;Verbal cues; Visual/Demos related to their stride length and posture, proper use of RW to promote proper body alignment, promote proper body posture and promote proper body mechanics. Treatment/Session Assessment:    · Response to Treatment:  Tolerated well even medicated. Left in recliner with son in law at bedside  · Interdisciplinary Collaboration:  · Physical Therapist and Registered Nurse  · After treatment position/precautions:  · Up in chair  · Bed/Chair-wheels locked  · Call light within reach  · RN notified  · Family at bedside  · Compliance with Program/Exercises: Will assess as treatment progresses. · Recommendations/Intent for next treatment session: \"Next visit will focus on advancements to more challenging activities and reduction in assistance provided\".   Total Treatment Duration:  PT Patient Time In/Time Out  Time In: 1325  Time Out: Gerald 0812, PT

## 2017-01-26 VITALS
HEIGHT: 70 IN | WEIGHT: 138.4 LBS | SYSTOLIC BLOOD PRESSURE: 135 MMHG | OXYGEN SATURATION: 94 % | HEART RATE: 57 BPM | RESPIRATION RATE: 18 BRPM | DIASTOLIC BLOOD PRESSURE: 64 MMHG | TEMPERATURE: 96 F | BODY MASS INDEX: 19.81 KG/M2

## 2017-01-26 LAB
ANION GAP BLD CALC-SCNC: 9 MMOL/L (ref 7–16)
BASOPHILS # BLD AUTO: 0 K/UL (ref 0–0.2)
BASOPHILS # BLD: 0 % (ref 0–2)
BUN SERPL-MCNC: 33 MG/DL (ref 8–23)
CALCIUM SERPL-MCNC: 8.9 MG/DL (ref 8.3–10.4)
CHLORIDE SERPL-SCNC: 106 MMOL/L (ref 98–107)
CO2 SERPL-SCNC: 25 MMOL/L (ref 21–32)
CREAT SERPL-MCNC: 1.64 MG/DL (ref 0.8–1.5)
DIFFERENTIAL METHOD BLD: ABNORMAL
EOSINOPHIL # BLD: 0.2 K/UL (ref 0–0.8)
EOSINOPHIL NFR BLD: 4 % (ref 0.5–7.8)
ERYTHROCYTE [DISTWIDTH] IN BLOOD BY AUTOMATED COUNT: 14.5 % (ref 11.9–14.6)
GLUCOSE SERPL-MCNC: 92 MG/DL (ref 65–100)
HCT VFR BLD AUTO: 42.9 % (ref 41.1–50.3)
HGB BLD-MCNC: 13.6 G/DL (ref 13.6–17.2)
LYMPHOCYTES # BLD AUTO: 23 % (ref 13–44)
LYMPHOCYTES # BLD: 1.4 K/UL (ref 0.5–4.6)
MCH RBC QN AUTO: 32 PG (ref 26.1–32.9)
MCHC RBC AUTO-ENTMCNC: 31.7 G/DL (ref 31.4–35)
MCV RBC AUTO: 100.9 FL (ref 79.6–97.8)
MM INDURATION POC: 0 MM (ref 0–5)
MONOCYTES # BLD: 0.5 K/UL (ref 0.1–1.3)
MONOCYTES NFR BLD AUTO: 8 % (ref 4–12)
NEUTS SEG # BLD: 4.1 K/UL (ref 1.7–8.2)
NEUTS SEG NFR BLD AUTO: 65 % (ref 43–78)
PLATELET # BLD AUTO: 135 K/UL (ref 150–450)
PMV BLD AUTO: 11.9 FL (ref 10.8–14.1)
POTASSIUM SERPL-SCNC: 4.4 MMOL/L (ref 3.5–5.1)
PPD POC: NORMAL NEGATIVE
RBC # BLD AUTO: 4.25 M/UL (ref 4.23–5.67)
SODIUM SERPL-SCNC: 140 MMOL/L (ref 136–145)
WBC # BLD AUTO: 6.2 K/UL (ref 4.3–11.1)

## 2017-01-26 PROCEDURE — 99218 HC RM OBSERVATION: CPT

## 2017-01-26 PROCEDURE — 96372 THER/PROPH/DIAG INJ SC/IM: CPT

## 2017-01-26 PROCEDURE — 97116 GAIT TRAINING THERAPY: CPT

## 2017-01-26 PROCEDURE — 80048 BASIC METABOLIC PNL TOTAL CA: CPT | Performed by: INTERNAL MEDICINE

## 2017-01-26 PROCEDURE — 74011250636 HC RX REV CODE- 250/636: Performed by: INTERNAL MEDICINE

## 2017-01-26 PROCEDURE — 85025 COMPLETE CBC W/AUTO DIFF WBC: CPT | Performed by: INTERNAL MEDICINE

## 2017-01-26 PROCEDURE — G8979 MOBILITY GOAL STATUS: HCPCS

## 2017-01-26 PROCEDURE — 97530 THERAPEUTIC ACTIVITIES: CPT

## 2017-01-26 PROCEDURE — 74011250637 HC RX REV CODE- 250/637: Performed by: INTERNAL MEDICINE

## 2017-01-26 PROCEDURE — 36415 COLL VENOUS BLD VENIPUNCTURE: CPT | Performed by: INTERNAL MEDICINE

## 2017-01-26 PROCEDURE — G8980 MOBILITY D/C STATUS: HCPCS

## 2017-01-26 RX ADMIN — LEVOTHYROXINE SODIUM 75 MCG: 75 TABLET ORAL at 10:17

## 2017-01-26 RX ADMIN — HEPARIN SODIUM 5000 UNITS: 5000 INJECTION, SOLUTION INTRAVENOUS; SUBCUTANEOUS at 10:17

## 2017-01-26 RX ADMIN — CARVEDILOL 6.25 MG: 6.25 TABLET, FILM COATED ORAL at 10:17

## 2017-01-26 RX ADMIN — HEPARIN SODIUM 5000 UNITS: 5000 INJECTION, SOLUTION INTRAVENOUS; SUBCUTANEOUS at 02:00

## 2017-01-26 RX ADMIN — CLOPIDOGREL BISULFATE 75 MG: 75 TABLET ORAL at 10:17

## 2017-01-26 RX ADMIN — Medication 10 ML: at 06:29

## 2017-01-26 RX ADMIN — SODIUM BICARBONATE TAB 650 MG 650 MG: 650 TAB at 10:17

## 2017-01-26 NOTE — PROGRESS NOTES
Discharge instructions given to patient and family copy provided along with educational material. No Rxs given. Family acknowledges understanding. IV removed tip intact. Pressure dsg applied. . Pt escorted by PCT and son-in-law to entrance C for ride home.

## 2017-01-26 NOTE — PROGRESS NOTES
TRANSFER - OUT REPORT:    Verbal report given to MILVIA Robledo on Antoinette Villa  being transferred to 350 med/surg for routine progression of care       Report consisted of patients Situation, Background, Assessment and   Recommendations(SBAR). Information from the following report(s) SBAR, ED Summary, Procedure Summary, Intake/Output, MAR, Accordion and Recent Results was reviewed with the receiving nurse. Lines:   Peripheral IV 01/24/17 Left Arm (Active)   Site Assessment Clean, dry, & intact 1/24/2017  7:00 PM   Phlebitis Assessment 0 1/24/2017  7:00 PM   Infiltration Assessment 0 1/24/2017  7:00 PM   Dressing Status Clean, dry, & intact 1/24/2017  7:00 PM   Dressing Type Transparent 1/24/2017  7:00 PM   Hub Color/Line Status Green 1/24/2017  7:00 PM   Alcohol Cap Used Yes 1/24/2017  7:00 PM        Opportunity for questions and clarification was provided.       Patient transported with:

## 2017-01-26 NOTE — DISCHARGE INSTRUCTIONS
DISCHARGE SUMMARY from Nurse    The following personal items are in your possession at time of discharge:    Dental Appliances: At bedside  Visual Aid: Glasses, With patient     Home Medications: None  Jewelry: None  Clothing: Pants, Shirt, Socks, Undergarments, With patient  Other Valuables: None             PATIENT INSTRUCTIONS:    After general anesthesia or intravenous sedation, for 24 hours or while taking prescription Narcotics:  · Limit your activities  · Do not drive and operate hazardous machinery  · Do not make important personal or business decisions  · Do  not drink alcoholic beverages  · If you have not urinated within 8 hours after discharge, please contact your surgeon on call. Report the following to your surgeon:  · Excessive pain, swelling, redness or odor of or around the surgical area  · Temperature over 100.5  · Nausea and vomiting lasting longer than 4 hours or if unable to take medications  · Any signs of decreased circulation or nerve impairment to extremity: change in color, persistent  numbness, tingling, coldness or increase pain  · Any questions        What to do at Home:  Recommended activity: Activity as tolerated and No driving while on analgesics,     If you experience any of the above symptoms, please follow up with MD.      *  Please give a list of your current medications to your Primary Care Provider. *  Please update this list whenever your medications are discontinued, doses are      changed, or new medications (including over-the-counter products) are added. *  Please carry medication information at all times in case of emergency situations. These are general instructions for a healthy lifestyle:    No smoking/ No tobacco products/ Avoid exposure to second hand smoke    Surgeon General's Warning:  Quitting smoking now greatly reduces serious risk to your health.     Obesity, smoking, and sedentary lifestyle greatly increases your risk for illness    A healthy diet, regular physical exercise & weight monitoring are important for maintaining a healthy lifestyle    You may be retaining fluid if you have a history of heart failure or if you experience any of the following symptoms:  Weight gain of 3 pounds or more overnight or 5 pounds in a week, increased swelling in our hands or feet or shortness of breath while lying flat in bed. Please call your doctor as soon as you notice any of these symptoms; do not wait until your next office visit. Recognize signs and symptoms of STROKE:    F-face looks uneven    A-arms unable to move or move unevenly    S-speech slurred or non-existent    T-time-call 911 as soon as signs and symptoms begin-DO NOT go       Back to bed or wait to see if you get better-TIME IS BRAIN. Warning Signs of HEART ATTACK     Call 911 if you have these symptoms:   Chest discomfort. Most heart attacks involve discomfort in the center of the chest that lasts more than a few minutes, or that goes away and comes back. It can feel like uncomfortable pressure, squeezing, fullness, or pain.  Discomfort in other areas of the upper body. Symptoms can include pain or discomfort in one or both arms, the back, neck, jaw, or stomach.  Shortness of breath with or without chest discomfort.  Other signs may include breaking out in a cold sweat, nausea, or lightheadedness. Don't wait more than five minutes to call 911 - MINUTES MATTER! Fast action can save your life. Calling 911 is almost always the fastest way to get lifesaving treatment. Emergency Medical Services staff can begin treatment when they arrive -- up to an hour sooner than if someone gets to the hospital by car. The discharge information has been reviewed with the patient and caregiver. The patient and caregiver verbalized understanding. Discharge medications reviewed with the patient and caregiver and appropriate educational materials and side effects teaching were provided.

## 2017-01-26 NOTE — PROGRESS NOTES
Problem: Interdisciplinary Rounds  Goal: Interdisciplinary Rounds  Interdisciplinary team rounds were held 1/26/2017 with the following team members:Care Management, Nursing, Nutrition and Pharmacy and the patient, child(shreya) and sibling(s). Plan of care discussed. See clinical pathway and/or care plan for interventions and desired outcomes.

## 2017-01-26 NOTE — PROGRESS NOTES
During report from Hannah Weber RN, the pt's daughter, Jhonny Renee, came to the desk and expressed concern for their father moving to a regular room. Dr. Squire Soulier, is in to speak with them now.

## 2017-01-26 NOTE — PROGRESS NOTES
Am assessment completed. Pt is alert and oriented. X 2 with occasional forgetfulness. Verbalizes needs well. Takes pills whole with thin liquids. Up with assist of walker and 1P. Safety measures in place. Continue to monitor.

## 2017-01-26 NOTE — PROGRESS NOTES
TRANSFER - IN REPORT:    Verbal report received from Anita Link RN on [address]  being received from ICU for routine progression of care      Report consisted of patients Situation, Background, Assessment and   Recommendations(SBAR). Information from the following report(s) SBAR, Kardex, Procedure Summary, MAR and Recent Results was reviewed with the receiving nurse. Opportunity for questions and clarification was provided. Assessment to be completed upon patients arrival to unit and care assumed.

## 2017-01-26 NOTE — PROGRESS NOTES
Hospitalist Follow Up Progress Note    2017       NAME: Radha Dumont   :  5/15/1922   MRN:  [id number]   Attending: Jeanine Rosado MD  PCP:  Gala Kelley MD  Treatment Team: Attending Provider: Jeanine Rosado MD; Utilization Review: Kenton Leyden, RN    Patient held overnight, due to on going weakness and confusion, due to sedative given during procedure for MRI. This morning his mentation has returned to normal he is ambulating around the room with sons assistance. n ocomplaints currently and he states his vision is doing better.     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

## 2017-01-26 NOTE — PROGRESS NOTES
Patient arrived to room, assisted by ICU RN, accompanied by several family members. Patient transferred to bed with max assist. Bed alarm on for patient safety. No signs of distress or pain. Assessment completed via flowsheet. Patient alert, confused, oriented to person only, recognizes family/visitors. Heart rate regular, S1 S2 auscultated. Lung sounds clear but diminished, respirations even, shallow, regular, and unlabored. Abd soft, non-tender with active bowel sounds x4 quadrants. Wound to top of left ear, no drainage, open to air. Patient denies pain at this time. Oriented to room and call light system. Instructed to call with needs. Bed low and locked, call light within reach. Up with max assist, bedside commode in room for toileting needs. Brief clean and dry. Patient resting comfortably in bed, safety measures in place.

## 2017-01-26 NOTE — PROGRESS NOTES
Problem: Mobility Impaired (Adult and Pediatric)  Goal: *Acute Goals and Plan of Care (Insert Text)  STG:  (1.)Mr. Lorenzo will move from supine to sit and sit to supine with CONTACT GUARD ASSIST within 4 day(s). MET 1/26/17  (2.)Mr. Lorenzo will transfer from bed to chair and chair to bed with CONTACT GUARD ASSIST using the least restrictive device within 4 day(s). (3.)Mr. Lorenzo will ambulate with CONTACT GUARD ASSIST for 50 feet with the least restrictive device within 4 day(s). (4.)pt. Will increase B LE strength 1/2 grade within 4 days      ________________________________________________________________________________________________      PHYSICAL THERAPY: Daily Note, Treatment Day: Day of Assessment and PM 1/26/2017  OBSERVATION: Hospital Day: 3  Payor: Caterina Colon / Plan: [address] [place] [address] HMO / Product Type: Managed Care Medicare /      NAME/AGE/GENDER: Goble Mohs is a 80 y.o. male             PRIMARY DIAGNOSIS: blurred vision Blurred vision, left eye Blurred vision, left eye        ICD-10: Treatment Diagnosis:       · Generalized Muscle Weakness (M62.81)  · Difficulty in walking, Not elsewhere classified (R26.2)  · Other abnormalities of gait and mobility (R26.89)   Precaution/Allergies:  Review of patient's allergies indicates no known allergies. ASSESSMENT:      Mr. Philip Whitehead presents with decreased LE strength, standing balance, functional mobility and safety awareness. He is admitted with blurriness in the L eye. He would benefit from further PT while here. His son in law states that he had received some OP PT for balance and shuffling gait in Dec. At [facility]. The pt. Lives in his own home, but family are with him 24 hrs a day. He is able to ambulate and complete his bathing and dressing on his own. Family prepares meals and supervises him  Pt. Is more alert today. He does require some verbal and tactile cues for exercises and gait training.  He tends to run into things with the walker. I did try to have him ambulate without the walker and he is worse. Had a discussion with his son in law and he would like to have him go for OP PT at [facility] since he has a relationship established with them. He will get the pts. RW out for him and will ambulate with him for safety. Mariano Cardenas, aware of these things. This section established at most recent assessment   PROBLEM LIST (Impairments causing functional limitations):  1. Decreased Strength  2. Decreased Transfer Abilities  3. Decreased Ambulation Ability/Technique  4. Decreased Balance  5. Decreased Activity Tolerance  6. Decreased Flexibility/Joint Mobility  7. Decreased [place] with Home Exercise Program  8. Decreased Cognition    INTERVENTIONS PLANNED: (Benefits and precautions of physical therapy have been discussed with the patient.)  1. Balance Exercise  2. Bed Mobility  3. Family Education  4. Gait Training  5. Home Exercise Program (HEP)  6. Neuromuscular Re-education/Strengthening  7. Range of Motion (ROM)  8. Therapeutic Activites  9. Therapeutic Exercise/Strengthening  10. Transfer Training  11. Group Therapy      TREATMENT PLAN: Frequency/Duration: daily for 1 week  Rehabilitation Potential For Stated Goals: FAIR      RECOMMENDED REHABILITATION/EQUIPMENT: (at time of discharge pending progress): Continue Skilled Therapy and Outpatient: Physical Therapy. At [facility] clinic                   HISTORY:   History of Present Injury/Illness (Reason for Referral): Admitted with blurred vision in L eye. Has had some balance issues when on feet over last couple of months  Past Medical History/Comorbidities:   Mr. Kim Ordaz  has a past medical history of Angina  (6/10/2016); Arthritis; CAD (coronary artery disease); Cancer ([address] Chronic kidney disease; GERD (gastroesophageal reflux disease); Heart failure ([address] Hypertension; Orthopnea; Other ill-defined conditions(799.89); Syncope, near; and Thyroid disease.  He also has no past medical history of Aneurysm ([facility] Arrhythmia; Autoimmune disease ([facility] Chronic pain; Liver disease; Morbid obesity ([facility] Psychiatric disorder; PUD (peptic ulcer disease); Seizures ([facility] Thromboembolus ([facility] or Unspecified sleep apnea. Mr. Wilmer Cordova  has a past surgical history that includes gi; appendectomy; other surgical; cardiac surg procedure unlist; and heent. Social History/Living Environment:   Home Environment: Private residence  # Steps to Enter: 3  Wheelchair Ramp: Yes  One/Two Story Residence: One story  Living Alone: Yes (sons stay 24 /7)  Support Systems: Child(shreya), Family member(s)  Patient Expects to be Discharged to[de-identified] Private residence  Current DME Used/Available at Home: Dayan Prey, rolling, Cane, straight  Tub or Shower Type: Shower  Prior Level of Function/Work/Activity:  Lives in his own home but has 4 hr supervision. He is able to bathe and dress himself and walks using a cane or walker at times. Number of Personal Factors/Comorbidities that affect the Plan of Care: 1-2: MODERATE COMPLEXITY   EXAMINATION:   Most Recent Physical Functioning:   Gross Assessment:  AROM: Generally decreased, functional (B LEs)  Strength: Generally decreased, functional (B hips 3+; knees/ankles 3+-4/5)  Sensation: Intact (B LEs)               Posture:  Posture Assessment: Forward head, Rounded shoulders (leans posteriorly)  Balance:  Sitting: Intact  Standing: Pull to stand; With support Bed Mobility:  Supine to Sit: Contact guard assistance  Scooting: Minimum assistance  Wheelchair Mobility:     Transfers:  Sit to Stand: Minimum assistance  Stand to Sit: Minimum assistance  Stand Pivot Transfers: Minimal assistance  Duration: 10 Minutes  Gait:     Base of Support: Narrowed  Speed/Cata: Pace decreased (<100 feet/min); Shuffled  Gait Abnormalities: Decreased step clearance; Path deviations; Shuffling gait  Distance (ft): 125 Feet (ft)  Assistive Device: Walker, rolling  Ambulation - Level of Assistance: Minimal assistance  Interventions: Safety awareness training;Verbal cues; Visual/Demos  Duration: 10 Minutes   I did try to have pt ambulate without the RW and he starts to shuffle and barely clear his feet. He tends to run into objects with the RW, but son in l;aw is aware of this and will supervise him at home. Body Structures Involved:  1. Joints  2. Muscles Body Functions Affected:  1. Neuromusculoskeletal  2. Movement Related Activities and Participation Affected:  1. Mobility   Number of elements that affect the Plan of Care: 4+: HIGH COMPLEXITY   CLINICAL PRESENTATION:   Presentation: Evolving clinical presentation with changing clinical characteristics: MODERATE COMPLEXITY   CLINICAL DECISION MAKIN [facility] Mobility Inpatient Short Form  How much difficulty does the patient currently have. .. Unable A Lot A Little None   1. Turning over in bed (including adjusting bedclothes, sheets and blankets)? [ ] 1   [ ] 2   [X] 3   [ ] 4   2. Sitting down on and standing up from a chair with arms ( e.g., wheelchair, bedside commode, etc.)   [ ] 1   [ ] 2   [X] 3   [ ] 4   3. Moving from lying on back to sitting on the side of the bed? [ ] 1   [ ] 2   [X] 3   [ ] 4   How much help from another person does the patient currently need. .. Total A Lot A Little None   4. Moving to and from a bed to a chair (including a wheelchair)? [ ] 1   [] 2   [x ] 3   [ ] 4   5. Need to walk in hospital room? [ ] 1   [] 2   [x ] 3   [ ] 4   6. Climbing 3-5 steps with a railing? [] 1   [x] 2   [ ] 3   [ ] 4   © , Trustees of [address] [place] [address] [address] [address], under license to [employer]. All rights reserved    Score:  Initial: 14 Most Recent: 17 (Date: 17 )     Interpretation of Tool:  Represents activities that are increasingly more difficult (i.e. Bed mobility, Transfers, Gait).        Score 24 23 22-20 19-15 14-10 9-7 6       Modifier CH CI CJ CK CL CM CN · Mobility - Walking and Moving Around:               - CURRENT STATUS:    CL - 60%-79% impaired, limited or restricted               - GOAL STATUS:           CK - 40%-59% impaired, limited or restricted               - D/C STATUS:                       CK - 40%-59% impaired, limited or restricted  Payor: Donna Katz / Plan: [address] [place] [address] [employer] / Product Type: Managed Care Medicare /       Medical Necessity:     · Patient demonstrates fair rehab potential due to higher previous functional level. Reason for Services/Other Comments:  · Patient continues to require present interventions due to patient's inability to perform functional mobility independently. Use of outcome tool(s) and clinical judgement create a POC that gives a: Questionable prediction of patient's progress: MODERATE COMPLEXITY                 TREATMENT:      Pre-treatment Symptoms/Complaints:  Sleepy, but awakens and willing to participate  Pain: Initial:   Pain Intensity 1: 0  Post Session:  0    Therapeutic Activity: (  10 Minutes ):  Therapeutic activities including Bed transfers, Chair transfers and bed mobility, sitting balance to improve mobility, strength, balance and coordination. Required minimal Safety awareness training;Verbal cues; Visual/Demos to insure safest functional mobility. Therapeutic Exercise: (10 Minutes):  Exercises per grid below to improve mobility, strength and coordination. Required minimal visual, verbal and tactile cues to promote proper body alignment and promote proper body breathing techniques. Progressed resistance, range, repetitions and complexity of movement as indicated.      Date:  1/26/17 Date:   Date:     Activity/Exercise Parameters Parameters Parameters   Ankle DF/PF 10     Hip AB/AD 10AA     Heel Slides 10AA     SLR 10AA     Sitting: LAQ 10         Shldr. Flex/ext 10          Elbow flex/ext 10         Gait Training (10 Minutes):  Gait training to improve and/or restore physical functioning as related to mobility, strength, balance and coordination. Ambulated 125 Feet (ft) with Minimal assistance using a Walker, rolling and moderate Safety awareness training;Verbal cues; Visual/Demos related to their stride length and posture, proper use of RW to promote proper body alignment, promote proper body posture and promote proper body mechanics. Treatment/Session Assessment:    · Response to Treatment:  Tolerated well even medicated. Left in recliner with son in law at bedside  · Interdisciplinary Collaboration:  · Physical Therapist, Registered Nurse and   · After treatment position/precautions:  · Up in chair, Bed/Chair-wheels locked, Call light within reach, RN notified and Family at bedside  · Compliance with Program/Exercises: Will assess as treatment progresses. · Recommendations/Intent for next treatment session: \"Next visit will focus on advancements to more challenging activities and reduction in assistance provided\".   Total Treatment Duration:  PT Patient Time In/Time Out  Time In: 0935  Time Out: [address] [place], [place]